# Patient Record
Sex: FEMALE | Race: BLACK OR AFRICAN AMERICAN | NOT HISPANIC OR LATINO
[De-identification: names, ages, dates, MRNs, and addresses within clinical notes are randomized per-mention and may not be internally consistent; named-entity substitution may affect disease eponyms.]

---

## 2018-05-19 ENCOUNTER — HOSPITAL (OUTPATIENT)
Dept: OTHER | Age: 63
End: 2018-05-19
Attending: FAMILY MEDICINE

## 2018-05-21 ENCOUNTER — TELEPHONE (OUTPATIENT)
Dept: INTERNAL MEDICINE CLINIC | Facility: CLINIC | Age: 63
End: 2018-05-21

## 2018-05-22 NOTE — TELEPHONE ENCOUNTER
Dr Eric Slaughter received xray of spine (lumbar, cervical) dated 5/19/18 from [address] for pt to call back. Was she in ER? Will need records. Xray spine report sent to scanning.

## 2018-06-04 ENCOUNTER — TELEPHONE (OUTPATIENT)
Dept: INTERNAL MEDICINE CLINIC | Facility: CLINIC | Age: 63
End: 2018-06-04
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
## 2018-06-06 ENCOUNTER — TELEPHONE (OUTPATIENT)
Dept: INTERNAL MEDICINE CLINIC | Facility: CLINIC | Age: 63
End: 2018-06-06

## 2018-06-06 NOTE — TELEPHONE ENCOUNTER
[place] RX monitoring site. She was treated by Dr Vega Francois. He practices at [facility]. Called and spoke to [place]. She will fax notes and xray report.
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
## 2018-06-07 ENCOUNTER — OFFICE VISIT (OUTPATIENT)
Dept: INTERNAL MEDICINE CLINIC | Facility: CLINIC | Age: 63
End: 2018-06-07

## 2018-06-07 VITALS
WEIGHT: 249.38 LBS | TEMPERATURE: 98 F | DIASTOLIC BLOOD PRESSURE: 76 MMHG | SYSTOLIC BLOOD PRESSURE: 128 MMHG | HEIGHT: 64 IN | BODY MASS INDEX: 42.58 KG/M2 | HEART RATE: 64 BPM | RESPIRATION RATE: 12 BRPM

## 2018-06-07 DIAGNOSIS — M54.2 NECK PAIN: Primary | ICD-10-CM

## 2018-06-07 DIAGNOSIS — M54.50 ACUTE BILATERAL LOW BACK PAIN WITHOUT SCIATICA: ICD-10-CM

## 2018-06-07 PROCEDURE — 99213 OFFICE O/P EST LOW 20 MIN: CPT | Performed by: NURSE PRACTITIONER

## 2018-06-07 RX ORDER — OMEPRAZOLE 20 MG/1
20 CAPSULE, DELAYED RELEASE ORAL
Qty: 30 CAPSULE | Refills: 2 | Status: SHIPPED | OUTPATIENT
Start: 2018-06-07 | End: 2018-08-29

## 2018-06-07 RX ORDER — ACETAMINOPHEN 500 MG
500 TABLET ORAL EVERY 6 HOURS PRN
COMMUNITY

## 2018-06-07 RX ORDER — ACETAMINOPHEN 325 MG/1
325 TABLET ORAL EVERY 6 HOURS PRN
COMMUNITY
End: 2018-06-07 | Stop reason: DRUGHIGH

## 2018-06-07 RX ORDER — OCTISALATE, AVOBENZONE, HOMOSALATE, AND OCTOCRYLENE 29.4; 29.4; 49; 25.48 MG/ML; MG/ML; MG/ML; MG/ML
LOTION TOPICAL AS NEEDED
COMMUNITY

## 2018-06-07 RX ORDER — CYCLOBENZAPRINE HCL 10 MG
10 TABLET ORAL NIGHTLY PRN
Qty: 10 TABLET | Refills: 0 | Status: SHIPPED | OUTPATIENT
Start: 2018-06-07 | End: 2018-06-21

## 2018-06-07 RX ORDER — OMEPRAZOLE 20 MG/1
20 CAPSULE, DELAYED RELEASE ORAL
COMMUNITY
End: 2018-06-07

## 2018-06-07 RX ORDER — NAPROXEN 500 MG/1
500 TABLET ORAL 2 TIMES DAILY WITH MEALS
Qty: 10 TABLET | Refills: 0 | Status: SHIPPED | OUTPATIENT
Start: 2018-06-07 | End: 2018-09-12

## 2018-06-07 NOTE — PROGRESS NOTES
Patient presents with:  Urgent: continues to experinece neck and low back pain. Wants medication for pain relief. Was given Norco and a muscle relaxant by Urgent Care. Last took 2 nights ago.    Medication Request: requesting rx for omeprazole 2 morning before breakfast. Disp: 30 capsule Rfl: 2   Multiple Vitamins-Minerals (MULTIVITAMIN OR) Take by mouth daily. Disp:  Rfl:    Cyanocobalamin (VITAMIN B 12 OR) Take by mouth daily. Disp:  Rfl:    LORATADINE Take by mouth daily as needed.    Disp:  Rfl time. Verbalized understanding of instructions and agreeable to this plan of care. No orders of the defined types were placed in this encounter.       Meds & Refills for this Visit:  Signed Prescriptions Disp Refills    naproxen 500 MG Oral Tab 10 tab

## 2018-06-21 ENCOUNTER — OFFICE VISIT (OUTPATIENT)
Dept: PHYSICAL THERAPY | Age: 63
End: 2018-06-21
Attending: NURSE PRACTITIONER
Payer: COMMERCIAL

## 2018-06-21 DIAGNOSIS — M54.2 NECK PAIN: ICD-10-CM

## 2018-06-21 DIAGNOSIS — M54.50 ACUTE BILATERAL LOW BACK PAIN WITHOUT SCIATICA: ICD-10-CM

## 2018-06-21 PROCEDURE — 97162 PT EVAL MOD COMPLEX 30 MIN: CPT | Performed by: PHYSICAL THERAPIST

## 2018-06-21 PROCEDURE — 97110 THERAPEUTIC EXERCISES: CPT | Performed by: PHYSICAL THERAPIST

## 2018-06-21 NOTE — PROGRESS NOTES
SPINE EVALUATION:   Referring Physician: Dr. Parish Vaughn  Diagnosis: neck pain, acute (B) low back pain     Date of Service: 6/21/2018     PATIENT Yolande Shone is a 65year old y/o female who presents to therapy today with complaints of neck and back cervical and lumbar ROM, soft tissue and joint hypomobility, decreased hip and shoulder strength also limited by back and neck pain and limited function due to pain.   Sadie Tolbert would benefit from skilled Physical Therapy to address the above impairments to [facility] mobility so patient is able to transition sit to stand with decreased pain. (3) Improve sleep posture so patient is able to sleep with decreased report of neck and back pain.   (4) Increase abdominal, hip and UE strength with good spinal stabilization so p

## 2018-06-28 ENCOUNTER — OFFICE VISIT (OUTPATIENT)
Dept: PHYSICAL THERAPY | Age: 63
End: 2018-06-28
Attending: NURSE PRACTITIONER
Payer: COMMERCIAL

## 2018-06-28 PROCEDURE — 97140 MANUAL THERAPY 1/> REGIONS: CPT | Performed by: PHYSICAL THERAPIST

## 2018-06-28 PROCEDURE — 97110 THERAPEUTIC EXERCISES: CPT | Performed by: PHYSICAL THERAPIST

## 2018-06-28 NOTE — PROGRESS NOTES
Dx: neck pain, acute (B) low back pain         Authorized # of Visits:  8         Next MD visit: none scheduled  Fall Risk: standard         Precautions: n/a             Subjective: Feeling better - using the heat on her neck and back which help.   The exer Total Timed Treatment: 45 min  Total Treatment Time: 45 min

## 2018-07-10 ENCOUNTER — OFFICE VISIT (OUTPATIENT)
Dept: PHYSICAL THERAPY | Age: 63
End: 2018-07-10
Attending: NURSE PRACTITIONER
Payer: COMMERCIAL

## 2018-07-10 PROCEDURE — 97110 THERAPEUTIC EXERCISES: CPT | Performed by: PHYSICAL THERAPIST

## 2018-07-10 PROCEDURE — 97140 MANUAL THERAPY 1/> REGIONS: CPT | Performed by: PHYSICAL THERAPIST

## 2018-07-10 NOTE — PROGRESS NOTES
Dx: neck pain, acute (B) low back pain         Authorized # of Visits:  8         Next MD visit: none scheduled  Fall Risk: standard         Precautions: n/a             Subjective: Back and neck are feeling better.   She is more stiff in her back this jaken Supine TA isometrics x 15s Supine TA isometrics x 15          seated UT stretch  (B) x 3 seated UT stretch  (B) x 3         Rhomboids stretch x 3 (B)         TA + SL march x 10        Skilled Services: manual, cuein    Charges: Manual (2), TherEx (1)

## 2018-07-12 ENCOUNTER — OFFICE VISIT (OUTPATIENT)
Dept: PHYSICAL THERAPY | Age: 63
End: 2018-07-12
Attending: NURSE PRACTITIONER
Payer: COMMERCIAL

## 2018-07-12 PROCEDURE — 97110 THERAPEUTIC EXERCISES: CPT | Performed by: PHYSICAL THERAPIST

## 2018-07-12 PROCEDURE — 97140 MANUAL THERAPY 1/> REGIONS: CPT | Performed by: PHYSICAL THERAPIST

## 2018-07-12 NOTE — PROGRESS NOTES
Dx: neck pain, acute (B) low back pain         Authorized # of Visits:  8         Next MD visit: none scheduled  Fall Risk: standard         Precautions: n/a             Subjective: Back and neck are feeling better. Felt great after last PT session. rotation AROM x 10 Cervical rotation AROM x 10 Cervical rotation AROM x 10       Supine TA isometrics x 15s Supine TA isometrics x 15   Supine TA isometrics x 15       seated UT stretch  (B) x 3 seated UT stretch  (B) x 3 seated UT stretch  (B) x 3

## 2018-07-17 ENCOUNTER — OFFICE VISIT (OUTPATIENT)
Dept: PHYSICAL THERAPY | Age: 63
End: 2018-07-17
Attending: NURSE PRACTITIONER
Payer: COMMERCIAL

## 2018-07-17 PROCEDURE — 97110 THERAPEUTIC EXERCISES: CPT | Performed by: PHYSICAL THERAPIST

## 2018-07-17 PROCEDURE — 97140 MANUAL THERAPY 1/> REGIONS: CPT | Performed by: PHYSICAL THERAPIST

## 2018-07-17 NOTE — PROGRESS NOTES
Dx: neck pain, acute (B) low back pain         Authorized # of Visits:  8         Next MD visit: none scheduled  Fall Risk: standard         Precautions: n/a             Subjective: Back and neck are feeling better.   Back is sore sometimes with work and at mobs (L)/(R) grade III/IIII+ x 6 min total sidelying lumbar rotational mobs (L)/(R) grade III/IIII+ x 6 min total    STM  (R) QL x 5 min      LTR x 10 LTR x 10 LTR x 10 LTR x 10      SKTC x 5 (B) SKTC x 5 (B) SKTC x 5 (B) SKTC x 5 (B)      Cervical rotatio

## 2018-07-19 ENCOUNTER — OFFICE VISIT (OUTPATIENT)
Dept: PHYSICAL THERAPY | Age: 63
End: 2018-07-19
Attending: NURSE PRACTITIONER
Payer: COMMERCIAL

## 2018-07-19 PROCEDURE — 97110 THERAPEUTIC EXERCISES: CPT | Performed by: PHYSICAL THERAPIST

## 2018-07-19 PROCEDURE — 97140 MANUAL THERAPY 1/> REGIONS: CPT | Performed by: PHYSICAL THERAPIST

## 2018-07-19 NOTE — PROGRESS NOTES
Authorized # of Visits:  8         Next MD visit: none scheduled  Fall Risk: standard         Precautions: n/a            Progress/Discharge Summary    Dx: neck pain, acute (B) low back pain      Pt has attended  6 visits in Physical Therapy.      Rose this course of care. Thank you for your referral. If you have any questions, please contact me at Dept: [phone].     Sincerely,  Electronically signed by therapist: Rosie ORTEGA      Date: 6/28/2018 TX#: 2/8 Date:  7/10/2018             TX#: 3/8

## 2018-08-29 ENCOUNTER — TELEPHONE (OUTPATIENT)
Dept: INTERNAL MEDICINE CLINIC | Facility: CLINIC | Age: 63
End: 2018-08-29

## 2018-08-30 RX ORDER — NAPROXEN 500 MG/1
500 TABLET ORAL 2 TIMES DAILY WITH MEALS
Qty: 10 TABLET | Refills: 0 | Status: CANCELLED | OUTPATIENT
Start: 2018-08-30

## 2018-08-30 NOTE — TELEPHONE ENCOUNTER
Omeprazole:  Last OV relevant to medication: 06/07/18  Last refill date: 06/07/18  #/refills: #30 / 2 refills  When pt was asked to return for OV: Not indicated.   Upcoming appt/reason: 09/06/18 for PE with Dr. Nyra Leventhal    Naproxen:  Last OV relevant to [facility]

## 2018-08-31 RX ORDER — OMEPRAZOLE 20 MG/1
20 CAPSULE, DELAYED RELEASE ORAL
Qty: 90 CAPSULE | Refills: 0 | Status: SHIPPED | OUTPATIENT
Start: 2018-08-31 | End: 2019-10-04 | Stop reason: ALTCHOICE

## 2018-09-04 NOTE — TELEPHONE ENCOUNTER
[facility] following up regarding refill request for Naproxen stated patient was requesting refill. [facility], [address] [place] [address] [phone], [phone]. Please advise. Thank you!

## 2018-09-12 ENCOUNTER — OFFICE VISIT (OUTPATIENT)
Dept: INTERNAL MEDICINE CLINIC | Facility: CLINIC | Age: 63
End: 2018-09-12
Payer: COMMERCIAL

## 2018-09-12 ENCOUNTER — LAB ENCOUNTER (OUTPATIENT)
Dept: LAB | Age: 63
End: 2018-09-12
Attending: INTERNAL MEDICINE
Payer: COMMERCIAL

## 2018-09-12 VITALS
TEMPERATURE: 99 F | SYSTOLIC BLOOD PRESSURE: 124 MMHG | RESPIRATION RATE: 12 BRPM | WEIGHT: 249.69 LBS | DIASTOLIC BLOOD PRESSURE: 78 MMHG | BODY MASS INDEX: 42.63 KG/M2 | HEART RATE: 60 BPM | HEIGHT: 64 IN

## 2018-09-12 DIAGNOSIS — Z00.00 PHYSICAL EXAM, ANNUAL: ICD-10-CM

## 2018-09-12 DIAGNOSIS — Z78.0 POSTMENOPAUSAL: ICD-10-CM

## 2018-09-12 DIAGNOSIS — Z12.31 SCREENING MAMMOGRAM, ENCOUNTER FOR: ICD-10-CM

## 2018-09-12 DIAGNOSIS — Z23 NEED FOR VACCINATION: ICD-10-CM

## 2018-09-12 LAB
ALBUMIN SERPL-MCNC: 4 G/DL (ref 3.5–4.8)
ALBUMIN/GLOB SERPL: 1.1 {RATIO} (ref 1–2)
ALP LIVER SERPL-CCNC: 85 U/L (ref 50–130)
ALT SERPL-CCNC: 16 U/L (ref 14–54)
ANION GAP SERPL CALC-SCNC: 7 MMOL/L (ref 0–18)
AST SERPL-CCNC: 18 U/L (ref 15–41)
BASOPHILS # BLD AUTO: 0.03 X10(3) UL (ref 0–0.1)
BASOPHILS NFR BLD AUTO: 0.6 %
BILIRUB SERPL-MCNC: 0.4 MG/DL (ref 0.1–2)
BUN BLD-MCNC: 16 MG/DL (ref 8–20)
BUN/CREAT SERPL: 11.2 (ref 10–20)
CALCIUM BLD-MCNC: 9.2 MG/DL (ref 8.3–10.3)
CHLORIDE SERPL-SCNC: 110 MMOL/L (ref 101–111)
CHOLEST SMN-MCNC: 214 MG/DL (ref ?–200)
CO2 SERPL-SCNC: 27 MMOL/L (ref 22–32)
CREAT BLD-MCNC: 1.43 MG/DL (ref 0.55–1.02)
EOSINOPHIL # BLD AUTO: 0.17 X10(3) UL (ref 0–0.3)
EOSINOPHIL NFR BLD AUTO: 3.6 %
ERYTHROCYTE [DISTWIDTH] IN BLOOD BY AUTOMATED COUNT: 14.3 % (ref 11.5–16)
GLOBULIN PLAS-MCNC: 3.7 G/DL (ref 2.5–4)
GLUCOSE BLD-MCNC: 87 MG/DL (ref 70–99)
HCT VFR BLD AUTO: 42.4 % (ref 34–50)
HDLC SERPL-MCNC: 54 MG/DL (ref 40–59)
HGB BLD-MCNC: 13.6 G/DL (ref 12–16)
IMMATURE GRANULOCYTE COUNT: 0.01 X10(3) UL (ref 0–1)
IMMATURE GRANULOCYTE RATIO %: 0.2 %
LDLC SERPL CALC-MCNC: 142 MG/DL (ref ?–100)
LYMPHOCYTES # BLD AUTO: 1.87 X10(3) UL (ref 0.9–4)
LYMPHOCYTES NFR BLD AUTO: 40.1 %
M PROTEIN MFR SERPL ELPH: 7.7 G/DL (ref 6.1–8.3)
MCH RBC QN AUTO: 29.1 PG (ref 27–33.2)
MCHC RBC AUTO-ENTMCNC: 32.1 G/DL (ref 31–37)
MCV RBC AUTO: 90.6 FL (ref 81–100)
MONOCYTES # BLD AUTO: 0.31 X10(3) UL (ref 0.1–1)
MONOCYTES NFR BLD AUTO: 6.7 %
NEUTROPHIL ABS PRELIM: 2.27 X10 (3) UL (ref 1.3–6.7)
NEUTROPHILS # BLD AUTO: 2.27 X10(3) UL (ref 1.3–6.7)
NEUTROPHILS NFR BLD AUTO: 48.8 %
NONHDLC SERPL-MCNC: 160 MG/DL (ref ?–130)
OSMOLALITY SERPL CALC.SUM OF ELEC: 299 MOSM/KG (ref 275–295)
PLATELET # BLD AUTO: 274 10(3)UL (ref 150–450)
POTASSIUM SERPL-SCNC: 4.2 MMOL/L (ref 3.6–5.1)
RBC # BLD AUTO: 4.68 X10(6)UL (ref 3.8–5.1)
RED CELL DISTRIBUTION WIDTH-SD: 47.4 FL (ref 35.1–46.3)
SODIUM SERPL-SCNC: 144 MMOL/L (ref 136–144)
TRIGL SERPL-MCNC: 90 MG/DL (ref 30–149)
TSI SER-ACNC: 2.7 MIU/ML (ref 0.35–5.5)
VLDLC SERPL CALC-MCNC: 18 MG/DL (ref 0–30)
WBC # BLD AUTO: 4.7 X10(3) UL (ref 4–13)

## 2018-09-12 PROCEDURE — 36415 COLL VENOUS BLD VENIPUNCTURE: CPT

## 2018-09-12 PROCEDURE — 99396 PREV VISIT EST AGE 40-64: CPT | Performed by: INTERNAL MEDICINE

## 2018-09-12 PROCEDURE — 85025 COMPLETE CBC W/AUTO DIFF WBC: CPT

## 2018-09-12 PROCEDURE — 84443 ASSAY THYROID STIM HORMONE: CPT

## 2018-09-12 PROCEDURE — 80061 LIPID PANEL: CPT

## 2018-09-12 PROCEDURE — 80053 COMPREHEN METABOLIC PANEL: CPT

## 2018-09-12 NOTE — PROGRESS NOTES
[facility]    CHIEF COMPLAINT: Patient presents with:  Routine Physical: 8/24/16 normal pap, neg HPV  Imm/Inj: declines flu shot. will get at work        HPI:   Kishore Magana is a 59year old female who presents for a complete physical exam. 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    Exercise: walking.   Diet: watches mini absent, Uterus:  Masses absent, Adnexa: Masses absent, Tenderness absent, Anus/Perineum:  Lesions absent and Masses absent  No pap today.    MUSCULOSKELETAL: back is not tender,FROM of the back  EXTREMITIES: no cyanosis, clubbing or edema  NEURO: Oriented (TBT=37848); Future    3. Postmenopausal  - XR DEXA BONE DENSITOMETRY (CPT=77080); Future    4. Need for vaccination  Declines. Will get at work. The patient is asked to return in 1 year for cpx.     Shantell Sauceda MD

## 2018-09-24 ENCOUNTER — TELEPHONE (OUTPATIENT)
Dept: INTERNAL MEDICINE CLINIC | Facility: CLINIC | Age: 63
End: 2018-09-24

## 2018-09-24 NOTE — TELEPHONE ENCOUNTER
Needs evaluation. If pain is severe go to urgent care. If persistent numbness and tingling go to urgent care. Otherwise see me tomorrow for evaluation. Okay to use my acute spot.

## 2018-09-24 NOTE — TELEPHONE ENCOUNTER
Spoke with pt  Advised eval needed. Denies persistent numbness or tingling or pain. Scheduled with Dr Clarence Turcios tomorrow morning.

## 2018-09-24 NOTE — TELEPHONE ENCOUNTER
Seen by Dr. Toyin Keen 9/12/18, patient states she has been taking omeprazole for 3 months and began having muscle cramping in calves that started yesterday morning. Patient came home last night, limping from tightness in calves.   Patient states she took [employer]

## 2018-09-25 ENCOUNTER — OFFICE VISIT (OUTPATIENT)
Dept: INTERNAL MEDICINE CLINIC | Facility: CLINIC | Age: 63
End: 2018-09-25
Payer: COMMERCIAL

## 2018-09-25 VITALS
SYSTOLIC BLOOD PRESSURE: 110 MMHG | TEMPERATURE: 98 F | BODY MASS INDEX: 42.59 KG/M2 | DIASTOLIC BLOOD PRESSURE: 74 MMHG | WEIGHT: 249.5 LBS | RESPIRATION RATE: 12 BRPM | HEIGHT: 64 IN | HEART RATE: 72 BPM

## 2018-09-25 DIAGNOSIS — R25.2 LEG CRAMPS: ICD-10-CM

## 2018-09-25 DIAGNOSIS — K21.9 GASTROESOPHAGEAL REFLUX DISEASE, ESOPHAGITIS PRESENCE NOT SPECIFIED: ICD-10-CM

## 2018-09-25 DIAGNOSIS — L72.9 BENIGN CYST OF SKIN: Primary | ICD-10-CM

## 2018-09-25 PROCEDURE — 99214 OFFICE O/P EST MOD 30 MIN: CPT | Performed by: INTERNAL MEDICINE

## 2018-09-25 RX ORDER — CYCLOBENZAPRINE HCL 10 MG
10 TABLET ORAL NIGHTLY PRN
Qty: 10 TABLET | Refills: 0 | Status: SHIPPED | OUTPATIENT
Start: 2018-09-25 | End: 2021-02-02 | Stop reason: ALTCHOICE

## 2018-09-25 RX ORDER — CYCLOBENZAPRINE HCL 10 MG
10 TABLET ORAL 3 TIMES DAILY PRN
COMMUNITY
End: 2018-09-25

## 2018-09-25 RX ORDER — MULTIVITAMIN WITH IRON
250 TABLET ORAL DAILY
COMMUNITY
End: 2021-02-02 | Stop reason: ALTCHOICE

## 2018-09-25 NOTE — PROGRESS NOTES
[facility]    CHIEF COMPLAINT:  Patient presents with:  Cramps: in calve.  2 days ago experienced cramping in left calf that traveled to the back of the knee. Took Magnesium 250mg. Also took cyclobenzaprine last night.    Imm/Inj: decli Rfl:    omeprazole 20 MG Oral Capsule Delayed Release Take 1 capsule (20 mg total) by mouth every morning before breakfast. Disp: 90 capsule Rfl: 0       PAST MEDICAL, SOCIAL, AND FAMILY HISTORY:  Tobacco:    Smoking status: Never Smoker   Smokeless tobacc x10(3) uL    RBC 4.68 3.80 - 5.10 x10(6)uL    HGB 13.6 12.0 - 16.0 g/dL    HCT 42.4 34.0 - 50.0 %    .0 150.0 - 450.0 10(3)uL    MCV 90.6 81.0 - 100.0 fL    MCH 29.1 27.0 - 33.2 pg    MCHC 32.1 31.0 - 37.0 g/dL    RDW 14.3 11.5 - 16.0 %    RDW-SD 47

## 2018-10-11 ENCOUNTER — TELEPHONE (OUTPATIENT)
Dept: INTERNAL MEDICINE CLINIC | Facility: CLINIC | Age: 63
End: 2018-10-11

## 2018-10-11 NOTE — TELEPHONE ENCOUNTER
Patient calling back stating she has been stretching, elevating her legs when sitting and keeping her feet warm. Patient is open to an OV, scheduled with Dr. Jen Felton 10/22/18.

## 2018-10-11 NOTE — TELEPHONE ENCOUNTER
Noted below, and LM for the patient to call us back. Has she been also doing what Dr. Arianna Rock recommends such as stretching exercises, keeping her feet warm, elevating her feet when sitting (see OV note from 09/25/18)?  Dr. Arianna Rock indicated on her OV note that t

## 2018-10-11 NOTE — TELEPHONE ENCOUNTER
Patient was in to see Dr. Farheen Lazaro on September 25th for muscle cramping. Patient states that the muscle cramping has gone away but is still feeling pain in her knee. Patient states that the pain is not noticeable when she has the ace bandage around it.  Once t

## 2018-10-15 ENCOUNTER — APPOINTMENT (OUTPATIENT)
Dept: GENERAL RADIOLOGY | Age: 63
End: 2018-10-15
Attending: NURSE PRACTITIONER
Payer: COMMERCIAL

## 2018-10-15 ENCOUNTER — HOSPITAL ENCOUNTER (OUTPATIENT)
Age: 63
Discharge: HOME | End: 2018-10-15
Payer: COMMERCIAL

## 2018-10-15 VITALS
SYSTOLIC BLOOD PRESSURE: 122 MMHG | HEART RATE: 62 BPM | OXYGEN SATURATION: 98 % | RESPIRATION RATE: 16 BRPM | TEMPERATURE: 97 F | DIASTOLIC BLOOD PRESSURE: 72 MMHG

## 2018-10-15 DIAGNOSIS — M25.562 ACUTE PAIN OF LEFT KNEE: ICD-10-CM

## 2018-10-15 DIAGNOSIS — M25.462 KNEE EFFUSION, LEFT: Primary | ICD-10-CM

## 2018-10-15 PROCEDURE — 99213 OFFICE O/P EST LOW 20 MIN: CPT

## 2018-10-15 PROCEDURE — 73560 X-RAY EXAM OF KNEE 1 OR 2: CPT | Performed by: NURSE PRACTITIONER

## 2018-10-15 RX ORDER — IBUPROFEN 800 MG/1
800 TABLET ORAL ONCE
Status: COMPLETED | OUTPATIENT
Start: 2018-10-15 | End: 2018-10-15

## 2018-10-15 RX ORDER — IBUPROFEN 800 MG/1
800 TABLET ORAL EVERY 8 HOURS PRN
Qty: 30 TABLET | Refills: 0 | Status: SHIPPED | OUTPATIENT
Start: 2018-10-15 | End: 2018-10-22

## 2018-10-15 NOTE — ED PROVIDER NOTES
Patient Seen in: [facility]    History   Patient presents with:  Knee Pain    Stated Complaint: pulled muscle calf knee pain x few weeks    70-year-old female who presents to the immediate care with complaints of left knee pain. negative. Positive for stated complaint: pulled muscle calf knee pain x few weeks  Other systems are as noted in HPI. Constitutional and vital signs reviewed. All other systems reviewed and negative except as noted above.     Physical Exam     ED with isolated injury documented above.  x-rays negative for acute fracture or dislocation. Patient's pain has improved with medications and  has no signs of neurovascular compromise or compartment syndrome.   I adressed with the patient the possibly of more

## 2018-10-15 NOTE — ED INITIAL ASSESSMENT (HPI)
Pt was moving a cough 2 weeks ago and pulled her calf muscle. The pain then moved to her knee. She saw her MD who gave muscle relaxers, but she stated that pain persists and 800mg of ibuprofen works better.

## 2018-11-05 RX ORDER — OMEPRAZOLE 20 MG/1
CAPSULE, DELAYED RELEASE ORAL
Qty: 90 CAPSULE | Refills: 0 | OUTPATIENT
Start: 2018-11-05

## 2019-01-25 ENCOUNTER — HOSPITAL ENCOUNTER (OUTPATIENT)
Age: 64
Discharge: HOME | End: 2019-01-25
Payer: COMMERCIAL

## 2019-01-25 ENCOUNTER — APPOINTMENT (OUTPATIENT)
Dept: GENERAL RADIOLOGY | Age: 64
End: 2019-01-25
Attending: NURSE PRACTITIONER
Payer: COMMERCIAL

## 2019-01-25 VITALS
DIASTOLIC BLOOD PRESSURE: 79 MMHG | HEART RATE: 67 BPM | OXYGEN SATURATION: 99 % | SYSTOLIC BLOOD PRESSURE: 160 MMHG | RESPIRATION RATE: 17 BRPM | TEMPERATURE: 98 F

## 2019-01-25 DIAGNOSIS — M25.562 ACUTE PAIN OF LEFT KNEE: Primary | ICD-10-CM

## 2019-01-25 PROCEDURE — 73562 X-RAY EXAM OF KNEE 3: CPT | Performed by: NURSE PRACTITIONER

## 2019-01-25 PROCEDURE — 99213 OFFICE O/P EST LOW 20 MIN: CPT

## 2019-01-25 RX ORDER — IBUPROFEN 800 MG/1
800 TABLET ORAL EVERY 8 HOURS PRN
Qty: 30 TABLET | Refills: 0 | Status: SHIPPED | OUTPATIENT
Start: 2019-01-25 | End: 2019-02-01

## 2019-01-25 NOTE — ED PROVIDER NOTES
Patient Seen in: [facility]    History   Patient presents with:  Lower Extremity Injury (musculoskeletal)    Stated Complaint: left knee pain    HPI  60 yo female presents with left medial knee pain after slipping on the ice 2 d round, and reactive to light. Cardiovascular: Normal rate, regular rhythm, normal heart sounds and intact distal pulses. Pulmonary/Chest: Effort normal and breath sounds normal.   Musculoskeletal:        Left knee: She exhibits swelling.  She exhibits n changes. Unable to rule out ligament damage. Pt given ace with f/u with ortho and pain meds. Pt agrees with f/u.           Disposition and Plan     Clinical Impression:  Acute pain of left knee  (primary encounter diagnosis)    Disposition:  Discharge  1

## 2019-01-25 NOTE — ED INITIAL ASSESSMENT (HPI)
Pt slipped on ice 2 days ago and twisted L knee - pt states she did not fall but has pain/swelling to L knee

## 2019-10-04 PROCEDURE — 86803 HEPATITIS C AB TEST: CPT | Performed by: FAMILY MEDICINE

## 2019-10-31 PROCEDURE — 88305 TISSUE EXAM BY PATHOLOGIST: CPT | Performed by: RADIOLOGY

## 2020-02-07 PROBLEM — M71.372 OTHER BURSAL CYST, LEFT ANKLE AND FOOT: Status: ACTIVE | Noted: 2020-02-07

## 2021-01-17 ENCOUNTER — HOSPITAL ENCOUNTER (OUTPATIENT)
Age: 66
Discharge: HOME | End: 2021-01-17

## 2021-01-17 VITALS
HEART RATE: 74 BPM | SYSTOLIC BLOOD PRESSURE: 147 MMHG | RESPIRATION RATE: 18 BRPM | DIASTOLIC BLOOD PRESSURE: 72 MMHG | OXYGEN SATURATION: 98 % | TEMPERATURE: 99 F

## 2021-01-17 DIAGNOSIS — B02.9 HERPES ZOSTER WITHOUT COMPLICATION: Primary | ICD-10-CM

## 2021-01-17 PROCEDURE — 99214 OFFICE O/P EST MOD 30 MIN: CPT

## 2021-01-17 RX ORDER — HYDROCODONE BITARTRATE AND ACETAMINOPHEN 5; 325 MG/1; MG/1
1 TABLET ORAL EVERY 6 HOURS PRN
Qty: 10 TABLET | Refills: 0 | Status: SHIPPED | OUTPATIENT
Start: 2021-01-17 | End: 2021-01-24

## 2021-01-17 RX ORDER — KETOROLAC TROMETHAMINE 5 MG/ML
1 SOLUTION OPHTHALMIC 4 TIMES DAILY
Qty: 5 ML | Refills: 0 | Status: SHIPPED | OUTPATIENT
Start: 2021-01-17 | End: 2021-02-02 | Stop reason: ALTCHOICE

## 2021-01-17 RX ORDER — VALACYCLOVIR HYDROCHLORIDE 1 G/1
1 TABLET, FILM COATED ORAL 3 TIMES DAILY
Qty: 21 TABLET | Refills: 0 | Status: SHIPPED | OUTPATIENT
Start: 2021-01-17 | End: 2021-01-24

## 2021-01-17 NOTE — ED INITIAL ASSESSMENT (HPI)
Pt began 5 days ago with what she thought was a rash to her rt side of face and into her hairline. It has since spread to her eye and has begun to blister. Pt states it is a tingling pain and has used benadryl, advil.
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
## 2021-11-11 PROBLEM — E66.01 MORBID OBESITY WITH BMI OF 40.0-44.9, ADULT (HCC): Status: ACTIVE | Noted: 2021-11-11

## 2021-11-11 PROBLEM — F33.1 MODERATE EPISODE OF RECURRENT MAJOR DEPRESSIVE DISORDER (HCC): Status: ACTIVE | Noted: 2021-11-11

## 2021-11-11 PROBLEM — N18.31 STAGE 3A CHRONIC KIDNEY DISEASE (HCC): Status: ACTIVE | Noted: 2021-11-11

## 2022-01-17 ENCOUNTER — OFFICE VISIT (OUTPATIENT)
Dept: INTERNAL MEDICINE CLINIC | Facility: CLINIC | Age: 67
End: 2022-01-17
Payer: MEDICARE

## 2022-01-17 VITALS
WEIGHT: 253 LBS | BODY MASS INDEX: 44.83 KG/M2 | SYSTOLIC BLOOD PRESSURE: 130 MMHG | TEMPERATURE: 98 F | HEART RATE: 60 BPM | HEIGHT: 63 IN | DIASTOLIC BLOOD PRESSURE: 80 MMHG | RESPIRATION RATE: 16 BRPM | OXYGEN SATURATION: 97 %

## 2022-01-17 DIAGNOSIS — B02.29 POSTHERPETIC NEURALGIA: Primary | ICD-10-CM

## 2022-01-17 DIAGNOSIS — F32.A DEPRESSION, UNSPECIFIED DEPRESSION TYPE: ICD-10-CM

## 2022-01-17 DIAGNOSIS — N18.31 STAGE 3A CHRONIC KIDNEY DISEASE (HCC): ICD-10-CM

## 2022-01-17 DIAGNOSIS — E66.01 CLASS 3 SEVERE OBESITY WITHOUT SERIOUS COMORBIDITY WITH BODY MASS INDEX (BMI) OF 40.0 TO 44.9 IN ADULT, UNSPECIFIED OBESITY TYPE (HCC): ICD-10-CM

## 2022-01-17 DIAGNOSIS — M71.372 OTHER BURSAL CYST, LEFT ANKLE AND FOOT: ICD-10-CM

## 2022-01-17 PROBLEM — E66.813 CLASS 3 SEVERE OBESITY WITHOUT SERIOUS COMORBIDITY WITH BODY MASS INDEX (BMI) OF 40.0 TO 44.9 IN ADULT: Status: ACTIVE | Noted: 2021-11-11

## 2022-01-17 PROBLEM — E66.813 CLASS 3 SEVERE OBESITY WITHOUT SERIOUS COMORBIDITY WITH BODY MASS INDEX (BMI) OF 40.0 TO 44.9 IN ADULT (HCC): Status: ACTIVE | Noted: 2021-11-11

## 2022-01-17 PROCEDURE — 3079F DIAST BP 80-89 MM HG: CPT | Performed by: INTERNAL MEDICINE

## 2022-01-17 PROCEDURE — 3075F SYST BP GE 130 - 139MM HG: CPT | Performed by: INTERNAL MEDICINE

## 2022-01-17 PROCEDURE — 99204 OFFICE O/P NEW MOD 45 MIN: CPT | Performed by: INTERNAL MEDICINE

## 2022-01-17 PROCEDURE — 3008F BODY MASS INDEX DOCD: CPT | Performed by: INTERNAL MEDICINE

## 2022-01-17 RX ORDER — GABAPENTIN 100 MG/1
CAPSULE ORAL
Qty: 360 CAPSULE | Refills: 0 | COMMUNITY
Start: 2022-01-17

## 2022-01-17 NOTE — PATIENT INSTRUCTIONS
Cut down the gabapentin to one tablet at night rather than 2  See podiatry  Exercise and do things you enjoy

## 2022-01-17 NOTE — PROGRESS NOTES
Patient Office Visit    ASSESSMENT AND PLAN:   (B02.29) Postherpetic neuralgia  (primary encounter diagnosis)  Plan:may take time to improve. Will decrease to gabapentin twice a day to see if symptoms of wheezing improve.  Stable currently     (F32.A) Mary Anne shingles on the right side of her face and near the eye. The rash completely resolved, but numbness/tingling continues although really improved. When she uses her CPAP machine that's when she feels it more.  She has also noticed gabapentin helps with the pa (MULTIVITAMIN OR), Take by mouth daily. , Disp: , Rfl:   LORATADINE, Take 10 mg by mouth daily as needed. , Disp: , Rfl:     No current facility-administered medications on file prior to visit.         REVIEW OF SYSTEMS   Constitutional: no fatigue normal e

## 2022-03-16 ENCOUNTER — OFFICE VISIT (OUTPATIENT)
Dept: INTERNAL MEDICINE CLINIC | Facility: CLINIC | Age: 67
End: 2022-03-16
Payer: MEDICARE

## 2022-03-16 VITALS
OXYGEN SATURATION: 96 % | DIASTOLIC BLOOD PRESSURE: 72 MMHG | BODY MASS INDEX: 45 KG/M2 | TEMPERATURE: 98 F | HEART RATE: 75 BPM | WEIGHT: 254 LBS | HEIGHT: 63 IN | SYSTOLIC BLOOD PRESSURE: 122 MMHG | RESPIRATION RATE: 16 BRPM

## 2022-03-16 DIAGNOSIS — Z78.0 POSTMENOPAUSAL: ICD-10-CM

## 2022-03-16 DIAGNOSIS — Z12.31 ENCOUNTER FOR SCREENING MAMMOGRAM FOR MALIGNANT NEOPLASM OF BREAST: ICD-10-CM

## 2022-03-16 DIAGNOSIS — Z00.00 ROUTINE PHYSICAL EXAMINATION: Primary | ICD-10-CM

## 2022-03-16 DIAGNOSIS — F33.1 MODERATE EPISODE OF RECURRENT MAJOR DEPRESSIVE DISORDER (HCC): ICD-10-CM

## 2022-03-16 DIAGNOSIS — N18.31 STAGE 3A CHRONIC KIDNEY DISEASE (HCC): ICD-10-CM

## 2022-03-16 DIAGNOSIS — E66.01 CLASS 3 SEVERE OBESITY WITHOUT SERIOUS COMORBIDITY WITH BODY MASS INDEX (BMI) OF 40.0 TO 44.9 IN ADULT, UNSPECIFIED OBESITY TYPE (HCC): ICD-10-CM

## 2022-03-16 PROBLEM — M71.372 OTHER BURSAL CYST, LEFT ANKLE AND FOOT: Status: RESOLVED | Noted: 2020-02-07 | Resolved: 2022-03-16

## 2022-03-16 PROCEDURE — 3078F DIAST BP <80 MM HG: CPT | Performed by: INTERNAL MEDICINE

## 2022-03-16 PROCEDURE — 3008F BODY MASS INDEX DOCD: CPT | Performed by: INTERNAL MEDICINE

## 2022-03-16 PROCEDURE — 96160 PT-FOCUSED HLTH RISK ASSMT: CPT | Performed by: INTERNAL MEDICINE

## 2022-03-16 PROCEDURE — 3074F SYST BP LT 130 MM HG: CPT | Performed by: INTERNAL MEDICINE

## 2022-03-16 PROCEDURE — 99397 PER PM REEVAL EST PAT 65+ YR: CPT | Performed by: INTERNAL MEDICINE

## 2022-03-16 PROCEDURE — G0402 INITIAL PREVENTIVE EXAM: HCPCS | Performed by: INTERNAL MEDICINE

## 2022-03-30 ENCOUNTER — HOSPITAL ENCOUNTER (OUTPATIENT)
Dept: MAMMOGRAPHY | Age: 67
Discharge: HOME | End: 2022-03-30
Attending: INTERNAL MEDICINE
Payer: MEDICARE

## 2022-03-30 ENCOUNTER — HOSPITAL ENCOUNTER (OUTPATIENT)
Dept: BONE DENSITY | Age: 67
Discharge: HOME | End: 2022-03-30
Attending: INTERNAL MEDICINE
Payer: MEDICARE

## 2022-03-30 ENCOUNTER — TELEPHONE (OUTPATIENT)
Dept: INTERNAL MEDICINE CLINIC | Facility: CLINIC | Age: 67
End: 2022-03-30

## 2022-03-30 DIAGNOSIS — Z78.0 POSTMENOPAUSAL: ICD-10-CM

## 2022-03-30 DIAGNOSIS — Z12.31 ENCOUNTER FOR SCREENING MAMMOGRAM FOR MALIGNANT NEOPLASM OF BREAST: ICD-10-CM

## 2022-03-30 PROCEDURE — 77080 DXA BONE DENSITY AXIAL: CPT | Performed by: INTERNAL MEDICINE

## 2022-03-30 PROCEDURE — 77063 BREAST TOMOSYNTHESIS BI: CPT | Performed by: INTERNAL MEDICINE

## 2022-03-30 PROCEDURE — 77067 SCR MAMMO BI INCL CAD: CPT | Performed by: INTERNAL MEDICINE

## 2022-03-30 NOTE — TELEPHONE ENCOUNTER
Please have patient schedule a video visit with me to discuss her bone density results  Ishan Carvalho DO

## 2022-03-30 NOTE — TELEPHONE ENCOUNTER
Future Appointments   Date Time Provider Elana Barlow   4/4/2022  1:00 PM Tim Dahl,  [facility] [place]

## 2022-04-04 ENCOUNTER — TELEMEDICINE (OUTPATIENT)
Dept: INTERNAL MEDICINE CLINIC | Facility: CLINIC | Age: 67
End: 2022-04-04

## 2022-04-04 DIAGNOSIS — M81.0 AGE-RELATED OSTEOPOROSIS WITHOUT CURRENT PATHOLOGICAL FRACTURE: Primary | ICD-10-CM

## 2022-04-04 PROCEDURE — 99213 OFFICE O/P EST LOW 20 MIN: CPT | Performed by: INTERNAL MEDICINE

## 2022-04-04 RX ORDER — ALENDRONATE SODIUM 70 MG/1
TABLET ORAL
Qty: 12 TABLET | Refills: 0 | Status: SHIPPED | OUTPATIENT
Start: 2022-04-04

## 2022-04-04 NOTE — PROGRESS NOTES
Virtual Telephone Check-In    This visit is conducted using Telemedicine with live, interactive video and audio. Patient resides in [place]   Patient understands and accepts financial responsibility for any deductible, co-insurance and/or co-pays associated with this service. Telehealth outside of [address] Verbal Consent   I conducted a telehealth visit with Merry Mclaughlin on 4/4/2022   which was completed using two-way, real-time interactive audio and video communication. This has been done in good luis to provide continuity of care in the best interest of the provider-patient relationship, due to the COVID -19 public health crisis/national emergency where restrictions of face-to-face office visits are ongoing. Every conscious effort was taken to allow for sufficient and adequate time to complete the visit. The patient was made aware of the limitations of the telehealth visit, including treatment limitations as no physical exam could be performed. The patient was advised to call 911 or to go to the ER in case there was an emergency. The patient was also advised of the potential privacy & security concerns related to the telehealth platform. The patient was made aware of where to find [facility] notice of privacy practices, telehealth consent form and other related consent forms and documents. which are located on the [facility] website. The patient verbally agreed to telehealth consent form, related consents and the risks discussed. Lastly, the patient confirmed that they were in [place]. Included in this visit, time may have been spent reviewing labs, medications, radiology tests and decision making. Appropriate medical decision-making and tests are ordered as detailed in the plan of care above. Coding/billing information is submitted for this visit based on complexity of care and/or time spent for the visit.   Time spent: 8 minutes   HPI: Tex Silveira is a 73year old female who is calling to discuss her test results. ROS:  General: Feels well overall  Skin: Denies any unusual skin lesions  Eyes: Denies blurred vision or double vision  All systems negative, except for above  Physical:  GENERAL: Alert and oriented, well developed, well nourished,in no apparent distress  SKIN: no rashes,no suspicious lesions on face  LUNGS: no audible wheezing  PSYCH: pleasant, appropriate mood and affect    Assessment and Plan  (M81.0) Age-related osteoporosis without current pathological fracture  (primary encounter diagnosis)  Plan: alendronate 70 MG Oral Tab  Note: will start medication above. Continue weight bearing exercises. Please take 2000 IU of vitamin D daily and calcium 600 mg through supplement and 600 mg through diet.  Will repeat test in 2 years  [address] [place] [address]

## 2022-06-21 ENCOUNTER — TELEPHONE (OUTPATIENT)
Dept: INTERNAL MEDICINE CLINIC | Facility: CLINIC | Age: 67
End: 2022-06-21

## 2022-06-21 DIAGNOSIS — M81.0 AGE-RELATED OSTEOPOROSIS WITHOUT CURRENT PATHOLOGICAL FRACTURE: ICD-10-CM

## 2022-06-21 NOTE — TELEPHONE ENCOUNTER
Pt called stating she has been taking alendronate 70 MG for awhile now and she would like to know if she is still supposed to take it. Pt has one pill left and unsure if she will need a refill or to discontinue taking it.

## 2022-06-22 RX ORDER — ALENDRONATE SODIUM 70 MG/1
TABLET ORAL
Qty: 12 TABLET | Refills: 3 | Status: SHIPPED | OUTPATIENT
Start: 2022-06-22

## 2022-06-22 NOTE — TELEPHONE ENCOUNTER
SV- refill for alendronate or discontinue? Spoke with patient via phone. Patient confirmed she took her last alendronate this week (12-week course complete). Will need refill if she is to continue.

## 2022-07-02 LAB
ABSOLUTE BASOPHILS: 42 CELLS/UL (ref 0–200)
ABSOLUTE EOSINOPHILS: 143 CELLS/UL (ref 15–500)
ABSOLUTE LYMPHOCYTES: 1777 CELLS/UL (ref 850–3900)
ABSOLUTE MONOCYTES: 252 CELLS/UL (ref 200–950)
ABSOLUTE NEUTROPHILS: 1987 CELLS/UL (ref 1500–7800)
ALT: 8 U/L (ref 6–29)
AST: 16 U/L (ref 10–35)
BASOPHILS: 1 %
BUN/CREATININE RATIO: 11 (CALC) (ref 6–22)
BUN: 18 MG/DL (ref 7–25)
CALCIUM: 10 MG/DL (ref 8.6–10.4)
CARBON DIOXIDE: 26 MMOL/L (ref 20–32)
CHLORIDE: 107 MMOL/L (ref 98–110)
CHOL/HDLC RATIO: 4.1 (CALC)
CHOLESTEROL, TOTAL: 217 MG/DL
CREATININE: 1.61 MG/DL (ref 0.5–0.99)
EGFR IF AFRICN AM: 38 ML/MIN/1.73M2
EGFR IF NONAFRICN AM: 33 ML/MIN/1.73M2
EOSINOPHILS: 3.4 %
GLUCOSE: 87 MG/DL (ref 65–99)
HDL CHOLESTEROL: 53 MG/DL
HEMATOCRIT: 42.3 % (ref 35–45)
HEMOGLOBIN A1C: 5.2 % OF TOTAL HGB
HEMOGLOBIN: 13.9 G/DL (ref 11.7–15.5)
LDL-CHOLESTEROL: 132 MG/DL (CALC)
LYMPHOCYTES: 42.3 %
MCH: 29.2 PG (ref 27–33)
MCHC: 32.9 G/DL (ref 32–36)
MCV: 88.9 FL (ref 80–100)
MONOCYTES: 6 %
MPV: 9.7 FL (ref 7.5–12.5)
NEUTROPHILS: 47.3 %
NON-HDL CHOLESTEROL: 164 MG/DL (CALC)
PLATELET COUNT: 256 THOUSAND/UL (ref 140–400)
POTASSIUM: 4.5 MMOL/L (ref 3.5–5.3)
RDW: 13.2 % (ref 11–15)
RED BLOOD CELL COUNT: 4.76 MILLION/UL (ref 3.8–5.1)
SODIUM: 142 MMOL/L (ref 135–146)
TRIGLYCERIDES: 188 MG/DL
TSH W/REFLEX TO FT4: 2.83 MIU/L (ref 0.4–4.5)
WHITE BLOOD CELL COUNT: 4.2 THOUSAND/UL (ref 3.8–10.8)

## 2022-07-06 DIAGNOSIS — M81.0 AGE-RELATED OSTEOPOROSIS WITHOUT CURRENT PATHOLOGICAL FRACTURE: ICD-10-CM

## 2022-07-06 DIAGNOSIS — N18.31 STAGE 3A CHRONIC KIDNEY DISEASE (HCC): Primary | ICD-10-CM

## 2022-07-12 ENCOUNTER — TELEMEDICINE (OUTPATIENT)
Dept: INTERNAL MEDICINE CLINIC | Facility: CLINIC | Age: 67
End: 2022-07-12

## 2022-07-12 DIAGNOSIS — E78.2 MIXED HYPERLIPIDEMIA: ICD-10-CM

## 2022-07-12 DIAGNOSIS — N18.31 STAGE 3A CHRONIC KIDNEY DISEASE (HCC): Primary | ICD-10-CM

## 2022-07-12 PROBLEM — E78.5 HYPERLIPIDEMIA: Status: ACTIVE | Noted: 2022-07-12

## 2022-07-12 PROCEDURE — 99213 OFFICE O/P EST LOW 20 MIN: CPT | Performed by: INTERNAL MEDICINE

## 2022-07-12 NOTE — PROGRESS NOTES
Virtual Telephone Check-In    This visit is conducted using Telemedicine with live, interactive video and audio. Patient resides in [place]   Patient understands and accepts financial responsibility for any deductible, co-insurance and/or co-pays associated with this service. Telehealth outside of [employer] Verbal Consent   I conducted a telehealth visit with Mike Levine on 7/12/2022   which was completed using two-way, real-time interactive audio and video  communication. This has been done in good luis to provide continuity of care in the best interest of the provider-patient relationship, due to the COVID -19 public health crisis/national emergency where restrictions of face-to-face office visits are ongoing. Every conscious effort was taken to allow for sufficient and adequate time to complete the visit. The patient was made aware of the limitations of the telehealth visit, including treatment limitations as no physical exam could be performed. The patient was advised to call 911 or to go to the ER in case there was an emergency. The patient was also advised of the potential privacy & security concerns related to the telehealth platform. The patient was made aware of where to find [facility] notice of privacy practices, telehealth consent form and other related consent forms and documents. which are located on the [facility] website. The patient verbally agreed to telehealth consent form, related consents and the risks discussed. Lastly, the patient confirmed that they were in [place]. Included in this visit, time may have been spent reviewing labs, medications, radiology tests and decision making. Appropriate medical decision-making and tests are ordered as detailed in the plan of care above. Coding/billing information is submitted for this visit based on complexity of care and/or time spent for the visit.   Time spent: 10 minutes   HPI: Gagandeep Novak is a 73year old female who is calling to discuss test results. Tolerating alendronate well, but does get constipated   ROS:  General: Feels well overall  Skin: Denies any unusual skin lesions  Eyes: Denies blurred vision or double vision  All systems negative, except for above  Physical:  GENERAL: Alert and oriented, well developed, well nourished,in no apparent distress  SKIN: no rashes,no suspicious lesions on face  LUNGS: no audible wheezing  PSYCH: pleasant, appropriate mood and affect    Assessment and Plan  (N18.31) Stage 3a chronic kidney disease ([facility] [address]  (primary encounter diagnosis)  Plan: recommended to increase fluid intake and will recheck labs in 2 weeks     (E78.2) Mixed hyperlipidemia  Plan: declines statin.  Will focus on diet and lifestyle changes and will repeat tests in 3 months    [facility]

## 2022-07-20 LAB
BUN/CREATININE RATIO: 12 (CALC) (ref 6–22)
BUN: 15 MG/DL (ref 7–25)
CALCIUM: 9.6 MG/DL (ref 8.6–10.4)
CARBON DIOXIDE: 28 MMOL/L (ref 20–32)
CHLORIDE: 107 MMOL/L (ref 98–110)
CREATININE: 1.21 MG/DL (ref 0.5–1.05)
EGFR: 50 ML/MIN/1.73M2
GLUCOSE: 88 MG/DL (ref 65–99)
POTASSIUM: 4.7 MMOL/L (ref 3.5–5.3)
SODIUM: 142 MMOL/L (ref 135–146)
VITAMIN D, 25-OH, TOTAL: 42 NG/ML (ref 30–100)

## 2022-08-22 DIAGNOSIS — B02.29 POSTHERPETIC NEURALGIA: ICD-10-CM

## 2022-08-22 RX ORDER — GABAPENTIN 100 MG/1
CAPSULE ORAL
Qty: 360 CAPSULE | Refills: 0 | Status: SHIPPED | OUTPATIENT
Start: 2022-08-22

## 2022-08-22 NOTE — TELEPHONE ENCOUNTER
LOV: 7/12/2022 with Dr. David De León  RTC: no follow-up on file  Last Relevant Labs: 7/19/2022  Filled: 1/17/2022    #360 with 0 refills    No future appointments.

## 2022-09-22 ENCOUNTER — IMMUNIZATION (OUTPATIENT)
Dept: LAB | Age: 67
End: 2022-09-22
Attending: EMERGENCY MEDICINE

## 2022-09-22 DIAGNOSIS — Z23 NEED FOR VACCINATION: Primary | ICD-10-CM

## 2022-09-22 PROCEDURE — 0124A SARSCOV2 VAC BVL 30MCG/0.3ML: CPT

## 2022-10-14 DIAGNOSIS — B02.29 POSTHERPETIC NEURALGIA: ICD-10-CM

## 2022-10-15 RX ORDER — GABAPENTIN 100 MG/1
CAPSULE ORAL
Qty: 360 CAPSULE | Refills: 0 | Status: SHIPPED | OUTPATIENT
Start: 2022-10-15

## 2022-11-14 ENCOUNTER — LAB ENCOUNTER (OUTPATIENT)
Dept: LAB | Age: 67
End: 2022-11-14
Attending: INTERNAL MEDICINE
Payer: MEDICARE

## 2022-11-14 LAB
CHOLEST SERPL-MCNC: 241 MG/DL (ref ?–200)
FASTING PATIENT LIPID ANSWER: YES
HDLC SERPL-MCNC: 66 MG/DL (ref 40–59)
LDLC SERPL CALC-MCNC: 154 MG/DL (ref ?–100)
NONHDLC SERPL-MCNC: 175 MG/DL (ref ?–130)
TRIGL SERPL-MCNC: 117 MG/DL (ref 30–149)
VLDLC SERPL CALC-MCNC: 22 MG/DL (ref 0–30)

## 2022-11-14 PROCEDURE — 80061 LIPID PANEL: CPT | Performed by: INTERNAL MEDICINE

## 2022-11-16 ENCOUNTER — PATIENT MESSAGE (OUTPATIENT)
Dept: INTERNAL MEDICINE CLINIC | Facility: CLINIC | Age: 67
End: 2022-11-16

## 2022-11-16 DIAGNOSIS — E78.2 MIXED HYPERLIPIDEMIA: Primary | ICD-10-CM

## 2022-11-16 RX ORDER — ATORVASTATIN CALCIUM 10 MG/1
10 TABLET, FILM COATED ORAL DAILY
Qty: 90 TABLET | Refills: 0 | Status: SHIPPED | OUTPATIENT
Start: 2022-11-16 | End: 2023-11-11

## 2022-11-16 NOTE — TELEPHONE ENCOUNTER
From: Sandip Service  To: [address] [place] [address]  Sent: 11/16/2022 12:59 PM CST  Subject: Cholesterol     Medication request for cholesterol and amount of mg.

## 2022-11-17 ENCOUNTER — PATIENT MESSAGE (OUTPATIENT)
Dept: INTERNAL MEDICINE CLINIC | Facility: CLINIC | Age: 67
End: 2022-11-17

## 2022-12-20 ENCOUNTER — OFFICE VISIT (OUTPATIENT)
Dept: INTERNAL MEDICINE CLINIC | Facility: CLINIC | Age: 67
End: 2022-12-20
Payer: MEDICARE

## 2022-12-20 VITALS
TEMPERATURE: 98 F | DIASTOLIC BLOOD PRESSURE: 72 MMHG | SYSTOLIC BLOOD PRESSURE: 118 MMHG | BODY MASS INDEX: 44.12 KG/M2 | HEIGHT: 63 IN | WEIGHT: 249 LBS | HEART RATE: 52 BPM | RESPIRATION RATE: 16 BRPM | OXYGEN SATURATION: 99 %

## 2022-12-20 DIAGNOSIS — E66.01 CLASS 3 SEVERE OBESITY WITHOUT SERIOUS COMORBIDITY WITH BODY MASS INDEX (BMI) OF 40.0 TO 44.9 IN ADULT, UNSPECIFIED OBESITY TYPE (HCC): ICD-10-CM

## 2022-12-20 DIAGNOSIS — E55.9 VITAMIN D DEFICIENCY: ICD-10-CM

## 2022-12-20 DIAGNOSIS — E78.2 MIXED HYPERLIPIDEMIA: Primary | ICD-10-CM

## 2022-12-20 DIAGNOSIS — N18.31 STAGE 3A CHRONIC KIDNEY DISEASE (HCC): ICD-10-CM

## 2022-12-20 DIAGNOSIS — M81.0 AGE-RELATED OSTEOPOROSIS WITHOUT CURRENT PATHOLOGICAL FRACTURE: ICD-10-CM

## 2022-12-20 DIAGNOSIS — B02.29 POST HERPETIC NEURALGIA: ICD-10-CM

## 2022-12-20 PROBLEM — F33.1 MODERATE EPISODE OF RECURRENT MAJOR DEPRESSIVE DISORDER (HCC): Status: RESOLVED | Noted: 2021-11-11 | Resolved: 2022-12-20

## 2022-12-20 PROCEDURE — 3074F SYST BP LT 130 MM HG: CPT | Performed by: INTERNAL MEDICINE

## 2022-12-20 PROCEDURE — 3008F BODY MASS INDEX DOCD: CPT | Performed by: INTERNAL MEDICINE

## 2022-12-20 PROCEDURE — G0009 ADMIN PNEUMOCOCCAL VACCINE: HCPCS | Performed by: INTERNAL MEDICINE

## 2022-12-20 PROCEDURE — 90677 PCV20 VACCINE IM: CPT | Performed by: INTERNAL MEDICINE

## 2022-12-20 PROCEDURE — 3078F DIAST BP <80 MM HG: CPT | Performed by: INTERNAL MEDICINE

## 2022-12-20 PROCEDURE — 99214 OFFICE O/P EST MOD 30 MIN: CPT | Performed by: INTERNAL MEDICINE

## 2022-12-20 RX ORDER — HYDROQUINONE
POWDER (GRAM) MISCELLANEOUS
COMMUNITY
Start: 2022-11-01

## 2022-12-20 NOTE — PATIENT INSTRUCTIONS
Please have blood tests done in 3 months    See me back in 6 months    You received the pneumonia vaccine today    Continue your medications

## 2023-03-02 DIAGNOSIS — E78.2 MIXED HYPERLIPIDEMIA: ICD-10-CM

## 2023-03-02 DIAGNOSIS — B02.29 POSTHERPETIC NEURALGIA: ICD-10-CM

## 2023-03-02 RX ORDER — GABAPENTIN 100 MG/1
CAPSULE ORAL
Qty: 360 CAPSULE | Refills: 0 | Status: SHIPPED | OUTPATIENT
Start: 2023-03-02

## 2023-03-02 RX ORDER — ATORVASTATIN CALCIUM 10 MG/1
10 TABLET, FILM COATED ORAL DAILY
Qty: 90 TABLET | Refills: 0 | Status: SHIPPED | OUTPATIENT
Start: 2023-03-02 | End: 2024-02-25

## 2023-03-21 ENCOUNTER — LAB ENCOUNTER (OUTPATIENT)
Dept: LAB | Age: 68
End: 2023-03-21
Attending: INTERNAL MEDICINE
Payer: MEDICARE

## 2023-03-21 DIAGNOSIS — M81.0 AGE-RELATED OSTEOPOROSIS WITHOUT CURRENT PATHOLOGICAL FRACTURE: ICD-10-CM

## 2023-03-21 DIAGNOSIS — E55.9 VITAMIN D DEFICIENCY: ICD-10-CM

## 2023-03-21 LAB
ALT SERPL-CCNC: 16 U/L
ANION GAP SERPL CALC-SCNC: 2 MMOL/L (ref 0–18)
AST SERPL-CCNC: 19 U/L (ref 15–37)
BUN BLD-MCNC: 12 MG/DL (ref 7–18)
CALCIUM BLD-MCNC: 9.4 MG/DL (ref 8.5–10.1)
CHLORIDE SERPL-SCNC: 110 MMOL/L (ref 98–112)
CHOLEST SERPL-MCNC: 146 MG/DL (ref ?–200)
CO2 SERPL-SCNC: 28 MMOL/L (ref 21–32)
CREAT BLD-MCNC: 1.25 MG/DL
FASTING PATIENT LIPID ANSWER: YES
FASTING STATUS PATIENT QL REPORTED: YES
GFR SERPLBLD BASED ON 1.73 SQ M-ARVRAT: 48 ML/MIN/1.73M2 (ref 60–?)
GLUCOSE BLD-MCNC: 89 MG/DL (ref 70–99)
HDLC SERPL-MCNC: 63 MG/DL (ref 40–59)
LDLC SERPL CALC-MCNC: 67 MG/DL (ref ?–100)
NONHDLC SERPL-MCNC: 83 MG/DL (ref ?–130)
OSMOLALITY SERPL CALC.SUM OF ELEC: 289 MOSM/KG (ref 275–295)
POTASSIUM SERPL-SCNC: 3.9 MMOL/L (ref 3.5–5.1)
SODIUM SERPL-SCNC: 140 MMOL/L (ref 136–145)
TRIGL SERPL-MCNC: 82 MG/DL (ref 30–149)
VIT D+METAB SERPL-MCNC: 37.5 NG/ML (ref 30–100)
VLDLC SERPL CALC-MCNC: 12 MG/DL (ref 0–30)

## 2023-03-21 PROCEDURE — 80048 BASIC METABOLIC PNL TOTAL CA: CPT | Performed by: INTERNAL MEDICINE

## 2023-03-21 PROCEDURE — 80061 LIPID PANEL: CPT | Performed by: INTERNAL MEDICINE

## 2023-03-21 PROCEDURE — 82306 VITAMIN D 25 HYDROXY: CPT

## 2023-03-21 PROCEDURE — 84450 TRANSFERASE (AST) (SGOT): CPT | Performed by: INTERNAL MEDICINE

## 2023-03-21 PROCEDURE — 84460 ALANINE AMINO (ALT) (SGPT): CPT | Performed by: INTERNAL MEDICINE

## 2023-04-14 ENCOUNTER — PATIENT MESSAGE (OUTPATIENT)
Dept: INTERNAL MEDICINE CLINIC | Facility: CLINIC | Age: 68
End: 2023-04-14

## 2023-04-17 NOTE — TELEPHONE ENCOUNTER
From: Susan Hawk  To:  [facility]  Sent: 4/14/2023 11:31 AM CDT  Subject: OVA    Overactive Bladder medication please lowest mg available Thanks

## 2023-04-22 ENCOUNTER — TELEMEDICINE (OUTPATIENT)
Dept: INTERNAL MEDICINE CLINIC | Facility: CLINIC | Age: 68
End: 2023-04-22
Payer: MEDICARE

## 2023-04-22 DIAGNOSIS — N39.46 MIXED STRESS AND URGE URINARY INCONTINENCE: Primary | ICD-10-CM

## 2023-04-22 DIAGNOSIS — B02.29 POSTHERPETIC NEURALGIA: ICD-10-CM

## 2023-04-22 DIAGNOSIS — R41.3 MEMORY CHANGE: ICD-10-CM

## 2023-04-22 PROCEDURE — 99213 OFFICE O/P EST LOW 20 MIN: CPT | Performed by: INTERNAL MEDICINE

## 2023-04-22 RX ORDER — OXYBUTYNIN CHLORIDE 5 MG/1
5 TABLET, EXTENDED RELEASE ORAL DAILY
Qty: 90 TABLET | Refills: 0 | Status: SHIPPED | OUTPATIENT
Start: 2023-04-22

## 2023-04-22 NOTE — PROGRESS NOTES
Virtual Telephone Check-In    This visit is conducted using Telemedicine with live, interactive video and audio. Patient resides in [place]   Patient understands and accepts financial responsibility for any deductible, co-insurance and/or co-pays associated with this service. Telehealth outside of [employer] Verbal Consent   I conducted a telehealth visit with Fernie Felipe on 4/22/2023   which was completed using two-way, real-time interactive audio and video  communication. This has been done in good luis to provide continuity of care in the best interest of the provider-patient relationship, due to the COVID -19 public health crisis/national emergency where restrictions of face-to-face office visits are ongoing. Every conscious effort was taken to allow for sufficient and adequate time to complete the visit. The patient was made aware of the limitations of the telehealth visit, including treatment limitations as no physical exam could be performed. The patient was advised to call 911 or to go to the ER in case there was an emergency. The patient was also advised of the potential privacy & security concerns related to the telehealth platform. The patient was made aware of where to find [facility] notice of privacy practices, telehealth consent form and other related consent forms and documents. which are located on the [facility] website. The patient verbally agreed to telehealth consent form, related consents and the risks discussed. Lastly, the patient confirmed that they were in [place]. Included in this visit, time may have been spent reviewing labs, medications, radiology tests and decision making. Appropriate medical decision-making and tests are ordered as detailed in the plan of care above. Coding/billing information is submitted for this visit based on complexity of care and/or time spent for the visit.   Time spent: 10 minutes  HPI: Ashtyn Ramírez is a 78year old female who is calling about urinary incontinence. She is going to the bathroom often. Has leakage too. Is able to get to the bathroom, but is irritating her and would like to try a medication  Her daughter has noticed that she has been having memory changes since starting gabapentin and wondering if there is anything else we can try to help with postherpetic neuralgia. Memory was fine before gabapentin   ROS:  General: Feels well overall  Skin: Denies any unusual skin lesions  Eyes: Denies blurred vision or double vision  All systems negative, except for above  Physical:  GENERAL: Alert and oriented, well developed, well nourished,in no apparent distress  SKIN: no rashes,no suspicious lesions on face  LUNGS: no audible wheezing  PSYCH: pleasant, appropriate mood and affect    Assessment and Plan  (N39.46) Mixed stress and urge urinary incontinence  (primary encounter diagnosis)  Plan: oxybutynin ER 5 MG Oral Tablet 24 Hr  Note: we can try low dose of the medication above. Discussed the side effects    (R41.3) Memory change  Plan: will stop gabapentin and see if any improvement.  If not will do further work up    (B02.29) Postherpetic neuralgia  Plan: will stop gabapentin and monitor  RTC in 1 month  Diego Mayer DO

## 2023-05-18 DIAGNOSIS — M81.0 AGE-RELATED OSTEOPOROSIS WITHOUT CURRENT PATHOLOGICAL FRACTURE: ICD-10-CM

## 2023-05-18 RX ORDER — ALENDRONATE SODIUM 70 MG/1
TABLET ORAL
Qty: 12 TABLET | Refills: 3 | Status: SHIPPED | OUTPATIENT
Start: 2023-05-18

## 2023-05-18 NOTE — TELEPHONE ENCOUNTER
LOV: 4/22/2023 with Dr. Arlette Raza  RTC: 1 month  Last Relevant Labs: 3/21/2023  Last bone dexa scan 3/30/2022  Filled: 6/22/2022     #12 with 3 refills    No future appointments.

## 2023-06-08 DIAGNOSIS — E78.2 MIXED HYPERLIPIDEMIA: ICD-10-CM

## 2023-06-08 NOTE — TELEPHONE ENCOUNTER
LOV: 4/22/2023 with Dr. Juan Buerger  RTC: 1 month  Last Relevant Labs: 3/21/2023   Filled: 3/2/2023    #90 with 0 refills    No future appointments.

## 2023-06-09 RX ORDER — ATORVASTATIN CALCIUM 10 MG/1
TABLET, FILM COATED ORAL
Qty: 90 TABLET | Refills: 1 | Status: SHIPPED | OUTPATIENT
Start: 2023-06-09

## 2023-07-12 ENCOUNTER — TELEPHONE (OUTPATIENT)
Dept: INTERNAL MEDICINE CLINIC | Facility: CLINIC | Age: 68
End: 2023-07-12

## 2023-07-12 DIAGNOSIS — B02.29 POSTHERPETIC NEURALGIA: ICD-10-CM

## 2023-07-12 RX ORDER — GABAPENTIN 100 MG/1
100 CAPSULE ORAL 2 TIMES DAILY PRN
Qty: 180 CAPSULE | Refills: 0 | Status: SHIPPED | OUTPATIENT
Start: 2023-07-12

## 2023-07-12 RX ORDER — GABAPENTIN 100 MG/1
CAPSULE ORAL
Qty: 360 CAPSULE | Refills: 0 | OUTPATIENT
Start: 2023-07-12

## 2023-07-12 RX ORDER — GABAPENTIN 100 MG/1
CAPSULE ORAL
Qty: 120 CAPSULE | Refills: 0 | Status: SHIPPED | OUTPATIENT
Start: 2023-07-12 | End: 2023-07-12

## 2023-07-12 NOTE — TELEPHONE ENCOUNTER
Protocol NONE    Requesting:  gabapentin 100 MG Oral Cap     LOV: 12/20/22 office visit  4/22/23 telemedicine visit (R41.3) Memory change  Plan: will stop gabapentin and see if any improvement. If not will do further work up       RTC: 1 month   Filled: 3/2/23 360 capsules 0 refills  Recent Labs: 3/21/23    Upcoming OV   No future appointments.

## 2023-07-17 NOTE — TELEPHONE ENCOUNTER
Patient called in stating that pharmacy will not release medication since instructions are wrong. Pharmacy stated to pt that it must say \"one to three tablets a day\". Can a MA call pharmacy to verify.        gabapentin 100 MG Oral Cap    [id number] - [place] [facility] - [address] [place] [address] [phone], [address] [place] [address]   Phone: [phone] Fax: [phone]

## 2023-09-11 DIAGNOSIS — E78.2 MIXED HYPERLIPIDEMIA: ICD-10-CM

## 2023-09-11 RX ORDER — ATORVASTATIN CALCIUM 10 MG/1
10 TABLET, FILM COATED ORAL DAILY
Qty: 100 TABLET | Refills: 1 | Status: SHIPPED | OUTPATIENT
Start: 2023-09-11

## 2023-09-13 ENCOUNTER — TELEPHONE (OUTPATIENT)
Dept: INTERNAL MEDICINE CLINIC | Facility: CLINIC | Age: 68
End: 2023-09-13

## 2023-10-26 DIAGNOSIS — B02.29 POSTHERPETIC NEURALGIA: ICD-10-CM

## 2023-10-26 RX ORDER — GABAPENTIN 100 MG/1
100 CAPSULE ORAL 3 TIMES DAILY PRN
Qty: 270 CAPSULE | Refills: 0 | Status: SHIPPED | OUTPATIENT
Start: 2023-10-26

## 2023-10-26 NOTE — TELEPHONE ENCOUNTER
Requested Prescriptions     Pending Prescriptions Disp Refills    GABAPENTIN 100 MG Oral Cap [Pharmacy Med Name: Gabapentin 100 Mg Cap Nort] 270 capsule 0     Sig: Take 1 capsule by mouth 3 times daily as needed. No protocol     LOV 12/20/22  VV 4/22/23  Filled 7/24/23 270 caps 0 refills   No future appointments.

## 2024-01-08 DIAGNOSIS — B02.29 POSTHERPETIC NEURALGIA: ICD-10-CM

## 2024-01-09 RX ORDER — GABAPENTIN 100 MG/1
100 CAPSULE ORAL 3 TIMES DAILY PRN
Qty: 270 CAPSULE | Refills: 0 | Status: SHIPPED | OUTPATIENT
Start: 2024-01-09

## 2024-01-09 NOTE — TELEPHONE ENCOUNTER
Protocol NONE    Requesting: gabapentin 100 MG Oral Cap     LOV: 12/20/22  RTC: 6 months  Filled: 10/26/23 270 capsule 0 refill  Recent Labs: 3/21/23    Upcoming OV   No future appointments.

## 2024-01-31 ENCOUNTER — APPOINTMENT (OUTPATIENT)
Dept: GENERAL RADIOLOGY | Age: 69
End: 2024-01-31
Attending: PHYSICIAN ASSISTANT
Payer: MEDICARE

## 2024-01-31 ENCOUNTER — HOSPITAL ENCOUNTER (OUTPATIENT)
Age: 69
Discharge: HOME | End: 2024-01-31
Payer: MEDICARE

## 2024-01-31 VITALS
WEIGHT: 220 LBS | TEMPERATURE: 98 F | DIASTOLIC BLOOD PRESSURE: 73 MMHG | RESPIRATION RATE: 18 BRPM | BODY MASS INDEX: 36.65 KG/M2 | HEART RATE: 55 BPM | SYSTOLIC BLOOD PRESSURE: 146 MMHG | HEIGHT: 65 IN | OXYGEN SATURATION: 97 %

## 2024-01-31 DIAGNOSIS — M79.631 RIGHT FOREARM PAIN: ICD-10-CM

## 2024-01-31 DIAGNOSIS — S62.316A CLOSED DISPLACED FRACTURE OF BASE OF FIFTH METACARPAL BONE OF RIGHT HAND, INITIAL ENCOUNTER: Primary | ICD-10-CM

## 2024-01-31 PROCEDURE — 29125 APPL SHORT ARM SPLINT STATIC: CPT

## 2024-01-31 PROCEDURE — 90471 IMMUNIZATION ADMIN: CPT

## 2024-01-31 PROCEDURE — 99204 OFFICE O/P NEW MOD 45 MIN: CPT

## 2024-01-31 PROCEDURE — 73130 X-RAY EXAM OF HAND: CPT | Performed by: PHYSICIAN ASSISTANT

## 2024-01-31 PROCEDURE — 73090 X-RAY EXAM OF FOREARM: CPT | Performed by: PHYSICIAN ASSISTANT

## 2024-01-31 PROCEDURE — 99214 OFFICE O/P EST MOD 30 MIN: CPT

## 2024-01-31 RX ORDER — IBUPROFEN 600 MG/1
600 TABLET ORAL EVERY 8 HOURS PRN
Qty: 15 TABLET | Refills: 0 | Status: SHIPPED | OUTPATIENT
Start: 2024-01-31

## 2024-01-31 NOTE — ED PROVIDER NOTES
Patient Seen in: [facility]      History   No chief complaint on file.    Stated Complaint: right hand pain,fell    Subjective:   HPI    69 YO female presents to immediate care for evaluation of right hand and right forearm pain starting today after slip and fall on concrete. Patient states she braced the fall with her right hand. Denies hitting her head, LOC, chest pain, SOB, abdominal pain. Patient states pain \"is not that bad.\"       Objective:   No pertinent past medical history.            No pertinent past surgical history.              No pertinent social history.            Review of Systems    Positive for stated complaint: right hand pain,fell  Other systems are as noted in HPI.  Constitutional and vital signs reviewed.      All other systems reviewed and negative except as noted above.    Physical Exam     ED Triage Vitals [01/31/24 1409]   /73   Pulse 55   Resp 18   Temp 97.8 °F (36.6 °C)   Temp src Temporal   SpO2 97 %   O2 Device None (Room air)       Current:/73   Pulse 55   Temp 97.8 °F (36.6 °C) (Temporal)   Resp 18   Ht 165.1 cm (5' 5\")   Wt 99.8 kg   LMP 07/01/2013   SpO2 97%   BMI 36.61 kg/m²         Physical Exam  Vitals and nursing note reviewed.   Constitutional:       General: She is not in acute distress.     Appearance: Normal appearance. She is not ill-appearing, toxic-appearing or diaphoretic.   Cardiovascular:      Rate and Rhythm: Normal rate.   Pulmonary:      Effort: Pulmonary effort is normal. No respiratory distress.   Musculoskeletal:      Right forearm: Tenderness (mild generalized tenderness) present. No swelling.      Right hand: Swelling (swelling at the base of the 5th metacarpal) and tenderness (tenderness at 5th metacarpal base) present.   Skin:     Findings: Abrasion (small superficial abrasion at the ulnar aspect of 5th MCP joint) present.   Neurological:      Mental Status: She is alert and oriented to person, place, and time.    Psychiatric:         Mood and Affect: Mood normal.         Behavior: Behavior normal.         ED Course   Labs Reviewed - No data to display  XR FOREARM (2 VIEWS), RIGHT (CPT=73090)    Result Date: 1/31/2024  PROCEDURE:  XR FOREARM (2 VIEWS), RIGHT (CPT=73090)  TECHNIQUE:  Two views were obtained.  COMPARISON:  None.  INDICATIONS:  right hand pain,fell  PATIENT STATED HISTORY: (As transcribed by Technologist)  Pt fell today outside. Pain to right 5th digit.    FINDINGS:  No fracture or dislocation.  Moderate narrowing of elbow joint.            CONCLUSION:  Osteoarthritic changes of elbow.  No acute abnormality.   LOCATION:  [id number]   Dictated by (CST): Rambo Marcus MD on 1/31/2024 at 2:57 PM     Finalized by (CST): Ramob Marcus MD on 1/31/2024 at 2:58 PM       XR HAND (MIN 3 VIEWS), RIGHT (CPT=73130)    Result Date: 1/31/2024  PROCEDURE:  XR HAND (MIN 3 VIEWS), RIGHT (CPT=73130)  TECHNIQUE:  Three views of the right hand were obtained.  COMPARISON:  None.  INDICATIONS:  right hand pain,fell  PATIENT STATED HISTORY: (As transcribed by Technologist)  Pt fell today outside. Pain to right 5th digit.    FINDINGS:  Oblique fracture of proximal 5th metatarsal with slight ulnar displacement of distal segment.  No dislocation.  Osteoarthritic changes of 1st interphalangeal joint.            CONCLUSION:  Mildly displaced fracture of base of 5th metacarpal.   LOCATION:  [id number]   Dictated by (CST): Rambo Marcus MD on 1/31/2024 at 2:55 PM     Finalized by (CST): Rambo Marcus MD on 1/31/2024 at 2:57 PM             MDM      This is a pleasant 67 YO female that presents to immediate care for evaluation of right hand and right forearm pain starting today after slip and fall on concrete. Patient states she braced the fall with her right hand.     Physical exam and XR as above. Ulnar gutter applied by nursing staff.   Boostrix updated.  Ibuprofen 600 mg prescribed for pain, per patient's request.  Patient to continue follow-up  with Orthopedics, information provided on discharge paperwork.        Medical Decision Making  Amount and/or Complexity of Data Reviewed  Radiology: ordered. Decision-making details documented in ED Course.        Disposition and Plan     Clinical Impression:  1. Closed displaced fracture of base of fifth metacarpal bone of right hand, initial encounter    2. Right forearm pain         Disposition:  Discharge  1/31/2024  3:47 pm    Follow-up:  Elizabeth Su, PA  [address] [place] [address]  [phone]    Schedule an appointment as soon as possible for a visit   for hand fracture          Medications Prescribed:  Current Discharge Medication List        START taking these medications    Details   ibuprofen 600 MG Oral Tab Take 1 tablet (600 mg total) by mouth every 8 (eight) hours as needed for Pain. Take with food.  Qty: 15 tablet, Refills: 0

## 2024-02-02 ENCOUNTER — OFFICE VISIT (OUTPATIENT)
Dept: ORTHOPEDICS CLINIC | Facility: CLINIC | Age: 69
End: 2024-02-02
Payer: MEDICARE

## 2024-02-02 VITALS — BODY MASS INDEX: 36.65 KG/M2 | HEIGHT: 65 IN | WEIGHT: 220 LBS

## 2024-02-02 DIAGNOSIS — S62.316A CLOSED DISPLACED FRACTURE OF BASE OF FIFTH METACARPAL BONE OF RIGHT HAND, INITIAL ENCOUNTER: Primary | ICD-10-CM

## 2024-02-02 PROCEDURE — 99203 OFFICE O/P NEW LOW 30 MIN: CPT | Performed by: PHYSICIAN ASSISTANT

## 2024-02-02 RX ORDER — LATANOPROST 50 UG/ML
SOLUTION/ DROPS OPHTHALMIC
COMMUNITY
Start: 2024-01-28

## 2024-02-02 NOTE — H&P
[facility] - ORTHOPEDICS  [address] [place] [address]  [phone]     NEW PATIENT VISIT - HISTORY AND PHYSICAL EXAMINATION     Name: Delia Rebolledo   MRN: [id number]  Date: 2/2/2024     CC: Right hand/wrist pain.     REFERRED BY: Angella Washington DO    HPI:   Delia Rebolledo is a very pleasant 68 year old right-hand dominant female who presents today for evaluation, consultation, and management of right hand injury that occurred after a fall on January 31, 2024.  She fell on concrete after slip and frustration with her fall and did not lose consciousness.  She presented to the emergency department and was referred to our service for further evaluation and management.  She rates her pain to be a 1 out of 10 and notes less than 50% normal function.    PMH:   Past Medical History:   Diagnosis Date    Allergic rhinitis        PAST SURGICAL HX:  Past Surgical History:   Procedure Laterality Date    COLONOSCOPY  9/12/13    repeat in 10 years    EGD  9/12/13    EXCISIONAL BIOSPY RIGHT Right 2016    benign    ZANDER BIOPSY STEREO NODULE 1 SITE RIGHT (CPT=19081) Right 09/2016    2 site    NEEDLE BIOPSY RIGHT Right 10/2019    OTHER SURGICAL HISTORY  10/2017    biopsy of right breast due to calcifications.  Benign       FAMILY HX:  Family History   Problem Relation Age of Onset    Other (allergies) Mother     Other (acid reflux) Mother     Hypertension Maternal Grandmother     Cancer Neg     Heart Disease Neg     Stroke Neg        ALLERGIES:  Patient has no known allergies.    MEDICATIONS:   Current Outpatient Medications   Medication Sig Dispense Refill    latanoprost 0.005 % Ophthalmic Solution       ibuprofen 600 MG Oral Tab Take 1 tablet (600 mg total) by mouth every 8 (eight) hours as needed for Pain. Take with food. 15 tablet 0    gabapentin 100 MG Oral Cap Take 1 capsule (100 mg total) by mouth 3 (three) times daily as needed. 270 capsule 0    atorvastatin 10 MG Oral Tab Take 1  tablet (10 mg total) by mouth daily. 100 tablet 1    alendronate 70 MG Oral Tab TAKE ONE TABLET BY MOUTH WEEKLY 12 tablet 3    oxybutynin ER 5 MG Oral Tablet 24 Hr Take 1 tablet (5 mg total) by mouth daily. 90 tablet 0    Hydroquinone Does not apply Powder ID [id number] Hydroquinone 5% cream Apply to the dark areas on the forehead and cheek once a day at night      Glucosamine-Chondroitin (GLUCOSAMINE CHONDR COMPLEX OR) Take by mouth daily.      Probiotic Product (ALIGN) 4 MG Oral Cap Take by mouth as needed.      acetaminophen 500 MG Oral Tab Take 1 tablet (500 mg total) by mouth every 6 (six) hours as needed for Pain.      Multiple Vitamins-Minerals (MULTIVITAMIN OR) Take by mouth daily.      LORATADINE Take 10 mg by mouth daily as needed.           ROS: A complete 10 point review of systems performed and negative aside from the aforementioned per history of present illness.     SOCIAL HX:  Social History     Occupational History    Occupation: cna   Tobacco Use    Smoking status: Never    Smokeless tobacco: Never   Vaping Use    Vaping Use: Never used   Substance and Sexual Activity    Alcohol use: Not Currently     Comment: approx 1 on holiday    Drug use: No    Sexual activity: Not on file         PE:   Vitals:    02/02/24 1020   Weight: 220 lb (99.8 kg)   Height: 5' 5\" (1.651 m)     Estimated body mass index is 36.61 kg/m² as calculated from the following:    Height as of this encounter: 5' 5\" (1.651 m).    Weight as of this encounter: 220 lb (99.8 kg).    Physical Exam  Constitutional:       Appearance: Normal appearance.   HENT:      Head: Normocephalic and atraumatic.   Eyes:      Extraocular Movements: Extraocular movements intact.   Neck:      Musculoskeletal: Normal range of motion and neck supple.   Cardiovascular:      Pulses: Normal pulses.   Pulmonary:      Effort: Pulmonary effort is normal. No respiratory distress.   Abdominal:      General: There is no distension.   Skin:     General: Skin is warm.       Capillary Refill: Capillary refill takes less than 2 seconds.      Findings: No bruising.   Neurological:      General: No focal deficit present.      Mental Status: Alert.   Psychiatric:         Mood and Affect: Mood normal.     Examination of the right hand demonstrates:     Skin is intact, warm and dry.     Inspection:   Atrophy: none    Effusion: none    Edema: mild    Erythema: none    Hematoma: none      Palpation:   Tenderness at fracture site.     ROM:   Not tested d/t nature of injury and fracture.     Strength: Not tested d/t nature of injury and fracture.     Special Tests:   Not tested d/t nature of injury and fracture.     No obvious peripheral edema noted.   Distal neurovascular exam demonstrates normal perfusion, intact sensation to light touch and full strength.     Examination of the contralateral extremity demonstrates:  No significant atrophy, swelling or effusion. Full range of motion. Neurovascularly intact distally.  The contralateral upper extremity is without limitation in range of motion or strength, no positive provocative maneuvers.       Radiographic Examination/Diagnostics:    I personally viewed, independently interpreted and radiology report was reviewed.    XR FOREARM (2 VIEWS), RIGHT (CPT=73090)    Result Date: 1/31/2024  PROCEDURE:  XR FOREARM (2 VIEWS), RIGHT (CPT=73090)  TECHNIQUE:  Two views were obtained.  COMPARISON:  None.  INDICATIONS:  right hand pain,fell  PATIENT STATED HISTORY: (As transcribed by Technologist)  Pt fell today outside. Pain to right 5th digit.    FINDINGS:  No fracture or dislocation.  Moderate narrowing of elbow joint.            CONCLUSION:  Osteoarthritic changes of elbow.  No acute abnormality.   LOCATION:  [facility]   Dictated by (CST): Rambo Marcus MD on 1/31/2024 at 2:57 PM     Finalized by (CST): Rambo Marcus MD on 1/31/2024 at 2:58 PM       XR HAND (MIN 3 VIEWS), RIGHT (CPT=73130)    Result Date: 1/31/2024  PROCEDURE:  XR HAND (MIN 3 VIEWS), RIGHT  (CPT=73130)  TECHNIQUE:  Three views of the right hand were obtained.  COMPARISON:  None.  INDICATIONS:  right hand pain,fell  PATIENT STATED HISTORY: (As transcribed by Technologist)  Pt fell today outside. Pain to right 5th digit.    FINDINGS:  Oblique fracture of proximal 5th metatarsal with slight ulnar displacement of distal segment.  No dislocation.  Osteoarthritic changes of 1st interphalangeal joint.            CONCLUSION:  Mildly displaced fracture of base of 5th metacarpal.   LOCATION:  [facility]   Dictated by (CST): Rambo Marcus MD on 1/31/2024 at 2:55 PM     Finalized by (CST): Rambo Marcus MD on 1/31/2024 at 2:57 PM         IMPRESSION: Delia Rebolledo is a 68 year old Right hand dominant female right 5th displaced fracture of base metacarpal amenable to conservative treatment.     PLAN:   We had a detailed discussion outlining the etiology, anatomy, pathophysiology, and natural history of the patient's findings. Imaging was reviewed in detail and correlated to a 3-dimensional model of the patient's pathology.     We reviewed the treatment of this disease condition.  Based on the her fracture pattern, I recommend conservative treatment. Exos with sleeve for three weeks, then transition to TKO. Follow up in six weeks with repeat x-rays to begin OT.       FOLLOW-UP:   6 weeks with x-rays.           Sincer JUSTINO Su, [facility], PA-C Orthopedic Surgery / Sports Medicine Specialist  [facility]  [address] [place] [address] [id number] [place] [address].org  [email or web address]  t: [phone]  o: [phone]  f: [phone]    This note was dictated using Dragon software.  While it was briefly proofread prior to completion, some grammatical, spelling, and word choice errors due to dictation may still occur.

## 2024-02-02 NOTE — ADDENDUM NOTE
Addended by: ANGLE SCOTT on: 2/2/2024 11:39 AM     Modules accepted: Orders     Thank you. I called her back. She wanted to ensure that I knew about her appointment and could follow for recommendations and treatment plans after. Questions answered. She has an appointment scheduled with me next month. Thank you!

## 2024-03-04 ENCOUNTER — TELEPHONE (OUTPATIENT)
Dept: ORTHOPEDICS CLINIC | Facility: CLINIC | Age: 69
End: 2024-03-04

## 2024-03-04 NOTE — TELEPHONE ENCOUNTER
Sincer,    **2 forms needing signature**     *The ACKNOWLEDGE button has been moved to the top right ribbon*    Please sign off on form if you agree to: Leave of Absence and RTW due to Closed displaced fracture of base of fifth metacarpal bone of right hand. Start date 01/31/24-pending re-eval 03/15/24.   (place your signature on the first page only)    -From your Inbasket, Highlight the patient and click Chart   -Double click the 03/04/24 Forms Completion telephone encounter  -Scroll down to the Media section   -Click the blue Hyperlink: RAFI VANESSA Su 03/04/24 and RTW VANESSA Su 03/04/24  -Click Acknowledge located in the top right ribbon/menu   -Drag the mouse into the blank space of the document and a + sign will appear. Left click to   electronically sign the document.     Thank you,     Aisha   breastfeeding exclusively

## 2024-03-04 NOTE — TELEPHONE ENCOUNTER
STD forms received. [employer] message sent for auth/BURAK. - Auth completed has wrong fax number (told pt this when I spoke to her on the phones). Logged for processing.

## 2024-03-04 NOTE — TELEPHONE ENCOUNTER
Pt calling asking for status of her STD forms that she turned in. Pt states she had already asked for an extension on her forms and brought them in again on Monday 2/26/23 to have forms completed. Did not find any documentation about forms. Found forms in inbox and will log forms to be completed.     Type of Leave:  STD   Reason for Leave: Closed displaced fracture of base of fifth metacarpal bone of right hand, initial encounter   Start date of leave: 1/31/24   How much time needed?: Pending re eval on 3/15/24.   Forms Due Date: 3/10/24 - already asked for an extension.   Was Fee and Turnaround info Given?: Yes '      Please send pt mychart when forms are completed

## 2024-03-05 NOTE — TELEPHONE ENCOUNTER
RAFI and RTW faxed to [facility] [phone]. Right fax confirmation received. [employer] message sent.

## 2024-03-15 ENCOUNTER — HOSPITAL ENCOUNTER (OUTPATIENT)
Dept: GENERAL RADIOLOGY | Age: 69
Discharge: HOME | End: 2024-03-15
Attending: PHYSICIAN ASSISTANT
Payer: MEDICARE

## 2024-03-15 ENCOUNTER — OFFICE VISIT (OUTPATIENT)
Dept: ORTHOPEDICS CLINIC | Facility: CLINIC | Age: 69
End: 2024-03-15
Payer: COMMERCIAL

## 2024-03-15 DIAGNOSIS — S62.316A CLOSED DISPLACED FRACTURE OF BASE OF FIFTH METACARPAL BONE OF RIGHT HAND, INITIAL ENCOUNTER: ICD-10-CM

## 2024-03-15 DIAGNOSIS — S62.316A CLOSED DISPLACED FRACTURE OF BASE OF FIFTH METACARPAL BONE OF RIGHT HAND, INITIAL ENCOUNTER: Primary | ICD-10-CM

## 2024-03-15 PROCEDURE — 99213 OFFICE O/P EST LOW 20 MIN: CPT | Performed by: PHYSICIAN ASSISTANT

## 2024-03-15 PROCEDURE — 73130 X-RAY EXAM OF HAND: CPT | Performed by: PHYSICIAN ASSISTANT

## 2024-03-15 NOTE — PROGRESS NOTES
[facility] - ORTHOPEDICS  [address] [place] [address]  [phone]       Name: Delia Rebolledo   MRN: [id number]  Date: 3/15/2024     REASON FOR VISIT: Follow up for right 5th displaced fracture of base metacarpal amenable to conservative treatment.     INTERVAL HISTORY:  Delia Rebolledo is a 68 year old female who returns for evaluation of right 5th displaced fracture of base metacarpal amenable to conservative treatment.     To summarize, right hand injury that occurred after a fall on January 31, 2024. She fell on concrete after slip and frustration with her fall and did not lose consciousness. She presented to the emergency department and was referred to our service for further evaluation and management.     At her last visit we recommended EXOs with sleeve and transitioning to TKO.  She presents today for follow-up.  She has noted improvement overall.      ROS: ROS    PE:   There were no vitals filed for this visit.  Estimated body mass index is 36.61 kg/m² as calculated from the following:    Height as of 2/2/24: 5' 5\" (1.651 m).    Weight as of 2/2/24: 220 lb (99.8 kg).    Physical Exam  Constitutional:       Appearance: Normal appearance.   HENT:      Head: Normocephalic and atraumatic.   Eyes:      Extraocular Movements: Extraocular movements intact.   Neck:      Musculoskeletal: Normal range of motion and neck supple.   Cardiovascular:      Pulses: Normal pulses.   Pulmonary:      Effort: Pulmonary effort is normal. No respiratory distress.   Abdominal:      General: There is no distension.   Skin:     General: Skin is warm.      Capillary Refill: Capillary refill takes less than 2 seconds.      Findings: No bruising.   Neurological:      General: No focal deficit present.      Mental Status: She is alert.   Psychiatric:         Mood and Affect: Mood normal.     Examination of the right hand/wrist demonstrates:     Skin is intact, warm and dry.     Inspection:   Atrophy:  none    Effusion: none    Edema: mild    Erythema: none    Hematoma: none    Nail changes:  none    Deformities: none      Palpation:   Radius: none    Ulna: none    Scaphoid: none    Lunate:  none    Triquetrum: none    Pisiform: none    Trapezium: none    Trapezoid: none    Capitate: none  Hamate: none   DRUJ: none   Dorsal Wrist: none   Rodriges Wrist: none   Metacarpals none   DIP: none   PIP: none     ROM:   Wrist: Full and symmetric   Fingers: Full and symmetric     Strength: Not tested d/t nature of injury.     Special Tests:  Deferred to nature of injury.     No obvious peripheral edema noted.   Distal neurovascular exam demonstrates normal perfusion, intact sensation to light touch and full strength.       Radiographic Examination/Diagnostics:    I personally viewed, independently interpreted and radiology report was reviewed.    X-rays Right Hand, 03/15/2024- Evidence of interval healing and callus formation.     IMPRESSION: Delia Rebolledo is a 68 year old female who presented for follow up of  right 5th displaced fracture of base metacarpal amenable to conservative treatment.     PLAN:   We had a detailed discussion outlining the etiology, anatomy, pathophysiology, and natural history of the patient's findings.    We reviewed the treatment of this disease condition.  We recommended physical therapy to aid in strengthening, range of motion, functional improvement, and return to baseline activity.  The patient had opportunity to ask questions and all questions were answered appropriately.    FOLLOW-UP:  8 weeks or as needed.             Elizabeth Su [facility], PA-C Orthopedic Surgery / Sports Medicine Specialist  [facility]  [address] [place] [address]   [email or web address]  [email or web address]  t: [phone]  o: [phone]  f: [phone]    This note was dictated using Dragon software.  While it was briefly proofread prior to completion, some grammatical, spelling, and word  choice errors due to dictation may still occur.

## 2024-03-26 DIAGNOSIS — B02.29 POSTHERPETIC NEURALGIA: ICD-10-CM

## 2024-03-27 RX ORDER — GABAPENTIN 100 MG/1
100 CAPSULE ORAL 3 TIMES DAILY PRN
Qty: 270 CAPSULE | Refills: 0 | Status: SHIPPED | OUTPATIENT
Start: 2024-03-27

## 2024-03-27 NOTE — TELEPHONE ENCOUNTER
Protocol FAIL    Requesting: gabapentin 100 MG Oral Cap     LOV: 12/20/22  RTC: 6 months  Filled: 1/9/24 270 capsule 0 refill  Recent Labs: 3/21/23    Upcoming OV   No future appointments.

## 2024-04-05 ENCOUNTER — MED REC SCAN ONLY (OUTPATIENT)
Dept: ORTHOPEDICS CLINIC | Facility: CLINIC | Age: 69
End: 2024-04-05

## 2024-05-06 ENCOUNTER — OFFICE VISIT (OUTPATIENT)
Dept: ORTHOPEDICS CLINIC | Facility: CLINIC | Age: 69
End: 2024-05-06
Payer: COMMERCIAL

## 2024-05-06 VITALS — HEIGHT: 65 IN | BODY MASS INDEX: 36.65 KG/M2 | WEIGHT: 220 LBS

## 2024-05-06 DIAGNOSIS — S62.316D CLOSED DISPLACED FRACTURE OF BASE OF FIFTH METACARPAL BONE OF RIGHT HAND WITH ROUTINE HEALING, SUBSEQUENT ENCOUNTER: Primary | ICD-10-CM

## 2024-05-06 PROCEDURE — 99213 OFFICE O/P EST LOW 20 MIN: CPT | Performed by: PHYSICIAN ASSISTANT

## 2024-05-06 NOTE — PROGRESS NOTES
[facility] - ORTHOPEDICS  [address] [place] [address]  [phone]       Name: Delia Rebolledo   MRN: [id number]  Date: 5/6/2024     REASON FOR VISIT: Follow up for right 5th displaced fracture of base metacarpal amenable to conservative treatment.     INTERVAL HISTORY:  Delia Rebolledo is a 68 year old female who returns for evaluation of right 5th displaced fracture of base metacarpal amenable to conservative treatment.     To summarize, right hand injury that occurred after a fall on January 31, 2024. She fell on concrete after slip and frustration with her fall and did not lose consciousness. She presented to the emergency department and was referred to our service for further evaluation and management. Overall doing well.      She rates her pain to be a 0/10.       ROS: ROS    PE:   Vitals:    05/06/24 0818   Weight: 220 lb (99.8 kg)   Height: 5' 5\" (1.651 m)     Estimated body mass index is 36.61 kg/m² as calculated from the following:    Height as of this encounter: 5' 5\" (1.651 m).    Weight as of this encounter: 220 lb (99.8 kg).    Physical Exam  Constitutional:       Appearance: Normal appearance.   HENT:      Head: Normocephalic and atraumatic.   Eyes:      Extraocular Movements: Extraocular movements intact.   Neck:      Musculoskeletal: Normal range of motion and neck supple.   Cardiovascular:      Pulses: Normal pulses.   Pulmonary:      Effort: Pulmonary effort is normal. No respiratory distress.   Abdominal:      General: There is no distension.   Skin:     General: Skin is warm.      Capillary Refill: Capillary refill takes less than 2 seconds.      Findings: No bruising.   Neurological:      General: No focal deficit present.      Mental Status: She is alert.   Psychiatric:         Mood and Affect: Mood normal.     Examination of the right hand/wrist demonstrates:     Skin is intact, warm and dry.     Inspection:   Atrophy: none    Effusion: none    Edema: none     Erythema: none    Hematoma: none    Nail changes:  none    Deformities: none      Palpation:   Radius: none    Ulna: none    Scaphoid: none    Lunate:  none    Triquetrum: none    Pisiform: none    Trapezium: none    Trapezoid: none    Capitate: none  Hamate: none   DRUJ: none   Dorsal Wrist: none   Rodriges Wrist: none   Metacarpals none   DIP: none   PIP: none     ROM:   Wrist: Full and symmetric   Fingers: Full and symmetric     Strength: normal     Special Tests:   Median Nerve: Negative  Ulnar Nerve: Negative   Radial Nerve:  Negative     No obvious peripheral edema noted.   Distal neurovascular exam demonstrates normal perfusion, intact sensation to light touch and full strength.         IMPRESSION: Delia Rebolledo is a 68 year old female who presented for follow up of  right 5th displaced fracture of base metacarpal amenable to conservative treatment.     PLAN:   We had a detailed discussion outlining the etiology, anatomy, pathophysiology, and natural history of the patient's findings.    The patient notes near complete resolution of symptoms, and return to full baseline function. The patient can follow up with our office as needed. The patient had the opportunity to ask questions, and all questions were answered appropriately.       FOLLOW-UP:  Return to clinic on an as needed basis.             Elizabeth Su [facility], PA-C Orthopedic Surgery / Sports Medicine Specialist  [facility]  [address] [place] [address] [id number] [place] [address].org  [email or web address]  t: [phone]  o: [phone]  f: [phone]    This note was dictated using Dragon software.  While it was briefly proofread prior to completion, some grammatical, spelling, and word choice errors due to dictation may still occur.

## 2024-06-06 DIAGNOSIS — B02.29 POSTHERPETIC NEURALGIA: ICD-10-CM

## 2024-06-06 RX ORDER — GABAPENTIN 100 MG/1
100 CAPSULE ORAL 3 TIMES DAILY PRN
Qty: 270 CAPSULE | Refills: 0 | Status: SHIPPED | OUTPATIENT
Start: 2024-06-06 | End: 2024-08-14

## 2024-06-06 NOTE — TELEPHONE ENCOUNTER
Protocol FAIL    Requesting: gabapentin 100 MG Oral Cap     LOV: 12/20/22  RTC: 6 MONTHS  Filled: 3/27/24 270 CAPSULE 0 REFILL  Recent Labs: 3/21/23    Upcoming OV   No future appointments.

## 2024-06-07 DIAGNOSIS — B02.29 POSTHERPETIC NEURALGIA: ICD-10-CM

## 2024-06-07 RX ORDER — GABAPENTIN 100 MG/1
100 CAPSULE ORAL 3 TIMES DAILY PRN
Qty: 270 CAPSULE | Refills: 0 | OUTPATIENT
Start: 2024-06-07

## 2024-06-22 DIAGNOSIS — E78.2 MIXED HYPERLIPIDEMIA: ICD-10-CM

## 2024-06-24 RX ORDER — ATORVASTATIN CALCIUM 10 MG/1
10 TABLET, FILM COATED ORAL DAILY
Qty: 100 TABLET | Refills: 0 | Status: SHIPPED | OUTPATIENT
Start: 2024-06-24

## 2024-06-24 NOTE — TELEPHONE ENCOUNTER
Requesting Atorvastatin 10mg  Protocol FAIL  Last in person visit 12/20/22  Last virtual visit 4/22/23  RTC 1 month  Last labs 3/21/23    Future Appointments   Date Time Provider Department Center   7/3/2024 10:00 AM Angella Washington,  [facility] [place]

## 2024-07-03 ENCOUNTER — OFFICE VISIT (OUTPATIENT)
Dept: INTERNAL MEDICINE CLINIC | Facility: CLINIC | Age: 69
End: 2024-07-03
Payer: COMMERCIAL

## 2024-07-03 ENCOUNTER — LAB ENCOUNTER (OUTPATIENT)
Dept: LAB | Age: 69
End: 2024-07-03
Attending: INTERNAL MEDICINE
Payer: MEDICARE

## 2024-07-03 VITALS
HEIGHT: 65 IN | OXYGEN SATURATION: 98 % | DIASTOLIC BLOOD PRESSURE: 72 MMHG | RESPIRATION RATE: 16 BRPM | HEART RATE: 53 BPM | TEMPERATURE: 97 F | WEIGHT: 240 LBS | SYSTOLIC BLOOD PRESSURE: 130 MMHG | BODY MASS INDEX: 39.99 KG/M2

## 2024-07-03 DIAGNOSIS — N18.31 STAGE 3A CHRONIC KIDNEY DISEASE (HCC): ICD-10-CM

## 2024-07-03 DIAGNOSIS — Z12.11 SCREENING FOR COLON CANCER: ICD-10-CM

## 2024-07-03 DIAGNOSIS — Z00.00 ROUTINE PHYSICAL EXAMINATION: Primary | ICD-10-CM

## 2024-07-03 DIAGNOSIS — Z12.31 ENCOUNTER FOR SCREENING MAMMOGRAM FOR MALIGNANT NEOPLASM OF BREAST: ICD-10-CM

## 2024-07-03 DIAGNOSIS — E55.9 VITAMIN D DEFICIENCY: ICD-10-CM

## 2024-07-03 DIAGNOSIS — E78.2 MIXED HYPERLIPIDEMIA: ICD-10-CM

## 2024-07-03 DIAGNOSIS — B02.29 POST HERPETIC NEURALGIA: ICD-10-CM

## 2024-07-03 DIAGNOSIS — R41.3 MEMORY CHANGE: ICD-10-CM

## 2024-07-03 DIAGNOSIS — M81.0 AGE-RELATED OSTEOPOROSIS WITHOUT CURRENT PATHOLOGICAL FRACTURE: ICD-10-CM

## 2024-07-03 DIAGNOSIS — E66.01 CLASS 3 SEVERE OBESITY WITHOUT SERIOUS COMORBIDITY WITH BODY MASS INDEX (BMI) OF 40.0 TO 44.9 IN ADULT, UNSPECIFIED OBESITY TYPE (HCC): ICD-10-CM

## 2024-07-03 DIAGNOSIS — R73.03 PREDIABETES: ICD-10-CM

## 2024-07-03 LAB
ALT SERPL-CCNC: 12 U/L
ANION GAP SERPL CALC-SCNC: 8 MMOL/L (ref 0–18)
AST SERPL-CCNC: 27 U/L (ref ?–34)
BASOPHILS # BLD AUTO: 0.04 X10(3) UL (ref 0–0.2)
BASOPHILS NFR BLD AUTO: 0.8 %
BUN BLD-MCNC: 11 MG/DL (ref 9–23)
BUN/CREAT SERPL: 8.9 (ref 10–20)
CALCIUM BLD-MCNC: 10 MG/DL (ref 8.7–10.4)
CHLORIDE SERPL-SCNC: 108 MMOL/L (ref 98–112)
CHOLEST SERPL-MCNC: 169 MG/DL (ref ?–200)
CO2 SERPL-SCNC: 27 MMOL/L (ref 21–32)
CREAT BLD-MCNC: 1.24 MG/DL
DEPRECATED RDW RBC AUTO: 48.3 FL (ref 35.1–46.3)
EGFRCR SERPLBLD CKD-EPI 2021: 48 ML/MIN/1.73M2 (ref 60–?)
EOSINOPHIL # BLD AUTO: 0.15 X10(3) UL (ref 0–0.7)
EOSINOPHIL NFR BLD AUTO: 3 %
ERYTHROCYTE [DISTWIDTH] IN BLOOD BY AUTOMATED COUNT: 14.3 % (ref 11–15)
EST. AVERAGE GLUCOSE BLD GHB EST-MCNC: 108 MG/DL (ref 68–126)
FASTING PATIENT LIPID ANSWER: YES
FASTING STATUS PATIENT QL REPORTED: YES
GLUCOSE BLD-MCNC: 90 MG/DL (ref 70–99)
HBA1C MFR BLD: 5.4 % (ref ?–5.7)
HCT VFR BLD AUTO: 41.4 %
HDLC SERPL-MCNC: 61 MG/DL (ref 40–59)
HGB BLD-MCNC: 13.5 G/DL
IMM GRANULOCYTES # BLD AUTO: 0.01 X10(3) UL (ref 0–1)
IMM GRANULOCYTES NFR BLD: 0.2 %
LDLC SERPL CALC-MCNC: 93 MG/DL (ref ?–100)
LYMPHOCYTES # BLD AUTO: 1.92 X10(3) UL (ref 1–4)
LYMPHOCYTES NFR BLD AUTO: 38.2 %
MCH RBC QN AUTO: 29.9 PG (ref 26–34)
MCHC RBC AUTO-ENTMCNC: 32.6 G/DL (ref 31–37)
MCV RBC AUTO: 91.6 FL
MONOCYTES # BLD AUTO: 0.32 X10(3) UL (ref 0.1–1)
MONOCYTES NFR BLD AUTO: 6.4 %
NEUTROPHILS # BLD AUTO: 2.59 X10 (3) UL (ref 1.5–7.7)
NEUTROPHILS # BLD AUTO: 2.59 X10(3) UL (ref 1.5–7.7)
NEUTROPHILS NFR BLD AUTO: 51.4 %
NONHDLC SERPL-MCNC: 108 MG/DL (ref ?–130)
OSMOLALITY SERPL CALC.SUM OF ELEC: 295 MOSM/KG (ref 275–295)
PLATELET # BLD AUTO: 246 10(3)UL (ref 150–450)
POTASSIUM SERPL-SCNC: 4.3 MMOL/L (ref 3.5–5.1)
RBC # BLD AUTO: 4.52 X10(6)UL
SODIUM SERPL-SCNC: 143 MMOL/L (ref 136–145)
TRIGL SERPL-MCNC: 83 MG/DL (ref 30–149)
TSI SER-ACNC: 1.77 MIU/ML (ref 0.55–4.78)
VIT D+METAB SERPL-MCNC: 38.4 NG/ML (ref 30–100)
VLDLC SERPL CALC-MCNC: 13 MG/DL (ref 0–30)
WBC # BLD AUTO: 5 X10(3) UL (ref 4–11)

## 2024-07-03 PROCEDURE — 36415 COLL VENOUS BLD VENIPUNCTURE: CPT | Performed by: INTERNAL MEDICINE

## 2024-07-03 PROCEDURE — 85025 COMPLETE CBC W/AUTO DIFF WBC: CPT | Performed by: INTERNAL MEDICINE

## 2024-07-03 PROCEDURE — 84450 TRANSFERASE (AST) (SGOT): CPT | Performed by: INTERNAL MEDICINE

## 2024-07-03 PROCEDURE — 84460 ALANINE AMINO (ALT) (SGPT): CPT | Performed by: INTERNAL MEDICINE

## 2024-07-03 PROCEDURE — 82306 VITAMIN D 25 HYDROXY: CPT

## 2024-07-03 PROCEDURE — 84443 ASSAY THYROID STIM HORMONE: CPT | Performed by: INTERNAL MEDICINE

## 2024-07-03 PROCEDURE — 99214 OFFICE O/P EST MOD 30 MIN: CPT | Performed by: INTERNAL MEDICINE

## 2024-07-03 PROCEDURE — 80061 LIPID PANEL: CPT | Performed by: INTERNAL MEDICINE

## 2024-07-03 PROCEDURE — 83036 HEMOGLOBIN GLYCOSYLATED A1C: CPT | Performed by: INTERNAL MEDICINE

## 2024-07-03 PROCEDURE — 80048 BASIC METABOLIC PNL TOTAL CA: CPT | Performed by: INTERNAL MEDICINE

## 2024-07-03 PROCEDURE — 99397 PER PM REEVAL EST PAT 65+ YR: CPT | Performed by: INTERNAL MEDICINE

## 2024-07-03 RX ORDER — ALENDRONATE SODIUM 70 MG/1
TABLET ORAL
Qty: 12 TABLET | Refills: 0 | Status: SHIPPED | OUTPATIENT
Start: 2024-07-03

## 2024-07-03 RX ORDER — TIRZEPATIDE 2.5 MG/.5ML
2.5 INJECTION, SOLUTION SUBCUTANEOUS WEEKLY
Qty: 2 ML | Refills: 0 | Status: SHIPPED | OUTPATIENT
Start: 2024-07-03

## 2024-07-03 NOTE — PATIENT INSTRUCTIONS
Continue to exercise at least 150 minutes a week and Eat a plant based diet     Please take 2000 IU of vitamin D daily for life to keep your bones strong    I have ordered an MRI of the brain  I have referred you to neuropsych testing    We will see if zepbound is covered  Please schedule your mammogram and do the FIT test for colon cancer     See me back in 6 weeks     Manolo Melendrez Psy.D  [address]  [place] [address]  Phone:  [phone]      [facility]  [address] [facility] [address]  Ph: [phone]      Neuropsychological services- Dr Fortune and Dr Calderón  [address] [facility] [address]  Ph: [phone]      Neurohealth- Dr Joe  [address]  [place] [address]  Phone: [phone]      [facility]  [address], [address], [place] [address]  Phone: [phone]

## 2024-07-03 NOTE — PROGRESS NOTES
Patient Office Visit    ASSESSMENT AND PLAN:   1. Routine physical examination  Note: Continue to exercise at least 150 minutes a week and Eat a plant based diet. Please take 2000 IU of vitamin D daily for life to keep your bones strong. Please see your dentist every 6 months. Continue with regular eye exams   - ALT(SGPT)  - AST (SGOT)  - Basic Metabolic Panel (8)  - Lipid Panel  - CBC With Differential With Platelet  - TSH W Reflex To Free T4    2. Stage 3a chronic kidney disease (HCC)  Note: will repeat labs.     3. Class 3 severe obesity without serious comorbidity with body mass index (BMI) of 40.0 to 44.9 in adult, unspecified obesity type (HCC)  Note: discussed the side effects with the patient. No personal or family history of medullary thyroid cancer or pancreatitis. If zepbound not covered then will try wegovy. Needs to work on walking and dietary changes   - Tirzepatide-Weight Management (ZEPBOUND) 2.5 MG/0.5ML Subcutaneous Solution Auto-injector; Inject 2.5 mg into the skin once a week.  Dispense: 2 mL; Refill: 0    4. Mixed hyperlipidemia  Note: continue statin     5. Age-related osteoporosis without current pathological fracture  Note: will repeat bone density. Continue alendronate. Not interested in prolia at this time  - XR DEXA BONE DENSITOMETRY (CPT=77080); Future    6. Post herpetic neuralgia  Note: we discussed about weaning off gabapentin, but patient declined. Unlikely the low dose is causing memory issues     7. Encounter for screening mammogram for malignant neoplasm of breast  - [facility] SMITA 2D+3D SCREENING BILAT (CPT=77067/29462); Future  - Basic Metabolic Panel (8)  - TSH W Reflex To Free T4    8. Memory change  Note: mini mental status exam was normal. Recommended neuropsych testing. Will obtain blood tests and MRI of the brain   - MRI BRAIN (CPT=70551); Future  - Neuropsych testing    9. Screening for colon cancer  - Occult Blood, Fecal, Immunoassay (Blue cards) [E]    10. Vitamin D  deficiency  - Vitamin D; Future    11. Prediabetes  - Hemoglobin A1C [E]    RTC in 6 weeks       Patient/Caregiver Education: Patient/Caregiver Education: There are no barriers to learning. Medical education done. Outcome: Patient verbalizes understanding. Patient is notified to call with any questions, complications, allergies, or worsening or changing symptoms.  Patient is to call with any side effects or complications from the treatments as a result of today.      Reviewed Past Medical History and   Patient Active Problem List   Diagnosis    Stage 3a chronic kidney disease (HCC)    Class 3 severe obesity without serious comorbidity with body mass index (BMI) of 40.0 to 44.9 in adult (HCC)    Post herpetic neuralgia    Age-related osteoporosis without current pathological fracture    Hyperlipidemia       Orders Placed This Encounter   Procedures    Occult Blood, Fecal, Immunoassay (Blue cards) [E]     Order Specific Question:   Release to patient     Answer:   Immediate    ALT(SGPT)    AST (SGOT)    Basic Metabolic Panel (8)    Lipid Panel    CBC With Differential With Platelet    TSH W Reflex To Free T4    Vitamin D     Standing Status:   Future     Standing Expiration Date:   7/3/2025     Order Specific Question:   Please pick the scenario that best fits the purpose for ordering this test     Answer:   General Screening/Vit D deficiency (25-Hydroxy)     Order Specific Question:   Release to patient     Answer:   Immediate    Hemoglobin A1C [E]     Order Specific Question:   Release to patient     Answer:   Immediate     Requested Prescriptions     Signed Prescriptions Disp Refills    Tirzepatide-Weight Management (ZEPBOUND) 2.5 MG/0.5ML Subcutaneous Solution Auto-injector 2 mL 0     Sig: Inject 2.5 mg into the skin once a week.         Angella Washington DO  CC:  Chief Complaint   Patient presents with    Follow - Up         HPI:   Delia Rebolledo is a 68 year old female who presents for a routine physical and to  discuss the following concerns. Here with her daughter    Memory change: yesterday the patient's daughter was very concerned because the daughter said \" you did not tell me it is April fools day.\"  Yesterday was July 2.  Patient states that she was watching something on TV that they were mentioning April Fools day.  The daughter has noticed some forgetfulness here and there but not very major.  The daughter has a family member who has dementia and she wants to make sure that her mother does not get dementia as well.  The patient continues to work regularly but is not very active.  She mainly stays in her room if she is not working.  She was at home for 3 months after breaking her hand after a fall.  The patient does not feel sad or depressed but does have some family stress in the past that she thinks about often per the daughter.  Right hip pain: Has been going on and off for the past few months and she was concerned it was due to alendronate so she stopped it recently.  She is wondering if she can restart it again.  Obesity: Patient does not have any family or personal history of medullary thyroid cancer or pancreatitis.  She would like to try GLP-1 inhibitors.  Postherpetic neuralgia: The patient wants to continue the gabapentin as it really helps.  The daughter was concerned that it could be causing her memory loss    Past Medical History:    Allergic rhinitis    Anxiety    Depression    Esophageal reflux    Hyperlipidemia       Past Surgical History:   Procedure Laterality Date    Colonoscopy  09/12/2013    repeat in 10 years    Colonoscopy  Fall of 2016    Egd  09/12/2013    Excisional biospy right Right 2016    benign    Rhys biopsy stereo nodule 1 site right (cpt=19081) Right 09/2016    2 site    Needle biopsy right Right 10/2019    Other surgical history  10/2017    biopsy of right breast due to calcifications.  Benign       Social History:  Social History     Socioeconomic History    Marital status: Single    Occupational History    Occupation: cna   Tobacco Use    Smoking status: Never    Smokeless tobacco: Never   Vaping Use    Vaping status: Never Used   Substance and Sexual Activity    Alcohol use: Not Currently     Comment: approx 1 on holiday    Drug use: No   Other Topics Concern    Caffeine Concern Yes    Exercise Yes    Seat Belt No    Special Diet No    Stress Concern No    Weight Concern No     Family History:  Family History   Problem Relation Age of Onset    Other (allergies) Mother     Other (acid reflux) Mother     Hypertension Maternal Grandmother     Cancer Neg     Heart Disease Neg     Stroke Neg      Allergies:  No Known Allergies  Current Meds:  Current Outpatient Medications on File Prior to Visit   Medication Sig Dispense Refill    ATORVASTATIN 10 MG Oral Tab TAKE ONE TABLET BY MOUTH ONE TIME DAILY 100 tablet 0    GABAPENTIN 100 MG Oral Cap Take 1 capsule (100 mg total) by mouth 3 (three) times daily as needed. 270 capsule 0    latanoprost 0.005 % Ophthalmic Solution       ibuprofen 600 MG Oral Tab Take 1 tablet (600 mg total) by mouth every 8 (eight) hours as needed for Pain. Take with food. 15 tablet 0    alendronate 70 MG Oral Tab TAKE ONE TABLET BY MOUTH WEEKLY 12 tablet 3    oxybutynin ER 5 MG Oral Tablet 24 Hr Take 1 tablet (5 mg total) by mouth daily. 90 tablet 0    Hydroquinone Does not apply Powder ID [id number] Hydroquinone 5% cream Apply to the dark areas on the forehead and cheek once a day at night      Glucosamine-Chondroitin (GLUCOSAMINE CHONDR COMPLEX OR) Take by mouth daily.      Probiotic Product (ALIGN) 4 MG Oral Cap Take by mouth as needed.      acetaminophen 500 MG Oral Tab Take 1 tablet (500 mg total) by mouth every 6 (six) hours as needed for Pain.      Multiple Vitamins-Minerals (MULTIVITAMIN OR) Take by mouth daily.      LORATADINE Take 10 mg by mouth daily as needed.         No current facility-administered medications on file prior to visit.         REVIEW OF SYSTEMS    Constitutional: no fatigue normal energy no weight changes   HENT: normal sinuses and no mouth issues   Eyes: . normal vision no eye pain   Respiratory: normal respirations no cough   Cardiovascular: no CP, or palpitations   Gastrointestinal: normal bowels and no abd pains   Genitourinary:  normal urination no hematuria, no frequency   Musculoskeletal: no pains in arms/legs, normal range of motion   Skin: no rashes or skin lesions that are new   Neurological: As above  Hematological:  no bruises or bleeding   Psychiatric/Behavioral: normal mood no anxiety normal behavior     /72 (BP Location: Left arm, Patient Position: Sitting, Cuff Size: adult)   Pulse 53   Temp 97.2 °F (36.2 °C) (Temporal)   Resp 16   Ht 5' 5\" (1.651 m)   Wt 240 lb (108.9 kg)   LMP 07/01/2013   SpO2 98%   BMI 39.94 kg/m²     PHYSICAL EXAM:   Constitutional: Vital signs reviewed as noted, well developed, in no acute distress.   HENT: NCAT, bilateral ear canal and tympanic membrane appear normal  Eyes: pupils reactive bilaterally  Neck: No thyroidmegaly  Cardiovascular: nl s1 s2 no m/r/g  Pulmonary/Chest: CTA bilaterally with no wheezes  Abdominal: Soft NT normal Bowel sounds  Musculoskeletal: Decrease in range of motion of the right hip and mild crepitus noted in the knees bilaterally  Extremities: no pedal edema   Neurological:  no weakness in UE and LE, reflexes are normal, CN 2-12 are intact, normal gait, sensation intact to light touch, Mini-Mental status exam was 30 out of 30  Skin: no rashes or bruises on visualized skin, not undressed   Psychiatric:normal mood

## 2024-07-03 NOTE — TELEPHONE ENCOUNTER
Protocol FAIL    Requesting: alendronate 70 MG Oral Tab     LOV: 7/3/24  RTC: 6 WEEKS  Filled: 5/18/23 12 TABLET 3 REFILL  Recent Labs: 7/3/24    Upcoming OV   No future appointments.

## 2024-07-16 ENCOUNTER — TELEPHONE (OUTPATIENT)
Dept: INTERNAL MEDICINE CLINIC | Facility: CLINIC | Age: 69
End: 2024-07-16

## 2024-07-16 DIAGNOSIS — R41.3 MEMORY CHANGE: ICD-10-CM

## 2024-07-16 DIAGNOSIS — M81.0 AGE-RELATED OSTEOPOROSIS WITHOUT CURRENT PATHOLOGICAL FRACTURE: Primary | ICD-10-CM

## 2024-07-16 NOTE — TELEPHONE ENCOUNTER
Patients daughter called in asking for referral to following facility recommended by provider.     Patient requesting referral: [facility]    Is insurance accurate in Epic and Verified: YES  Is there already an open/pending/approved referral: NO    Specialty: [facility]  Refer to Provider (Physician's first and last name - referral needs to be under collaborating MD's name):   [facility]  [address] [facility] [address]  Ph: [phone]    Fax: [phone]  Diagnosis or reason they are being seen: New Pt     If Requesting Out of Network Provider, please provide reason: Per Daughter No     Patient Call Back Number:

## 2024-07-17 NOTE — TELEPHONE ENCOUNTER
Patient requesting referral to recommended facility.     Last OV: 7/3/24    [facility]  [address] [place] [id number]  Ph: [phone]    Order pended, please sign if appropriate. Thank you.

## 2024-07-24 ENCOUNTER — HOSPITAL ENCOUNTER (OUTPATIENT)
Dept: MAMMOGRAPHY | Age: 69
Discharge: HOME | End: 2024-07-24
Attending: INTERNAL MEDICINE
Payer: MEDICARE

## 2024-07-24 DIAGNOSIS — Z12.31 ENCOUNTER FOR SCREENING MAMMOGRAM FOR MALIGNANT NEOPLASM OF BREAST: ICD-10-CM

## 2024-07-24 PROCEDURE — 77067 SCR MAMMO BI INCL CAD: CPT | Performed by: INTERNAL MEDICINE

## 2024-07-24 PROCEDURE — 77063 BREAST TOMOSYNTHESIS BI: CPT | Performed by: INTERNAL MEDICINE

## 2024-08-02 ENCOUNTER — TELEPHONE (OUTPATIENT)
Dept: INTERNAL MEDICINE CLINIC | Facility: CLINIC | Age: 69
End: 2024-08-02

## 2024-08-02 NOTE — TELEPHONE ENCOUNTER
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 Please call back and advise.

## 2024-08-02 NOTE — TELEPHONE ENCOUNTER
Patient's daughter Robina called her Mother works at a nursing home.    They are asking for a letter stating patient should be excused from working with Covid positive patient's until she is able to complete upcoming scheduled imaging. Wants to be excused through 9/1.     Letter pended.

## 2024-08-07 ENCOUNTER — HOSPITAL ENCOUNTER (OUTPATIENT)
Dept: BONE DENSITY | Age: 69
Discharge: HOME | End: 2024-08-07
Attending: INTERNAL MEDICINE
Payer: MEDICARE

## 2024-08-07 DIAGNOSIS — M81.0 AGE-RELATED OSTEOPOROSIS WITHOUT CURRENT PATHOLOGICAL FRACTURE: ICD-10-CM

## 2024-08-07 PROCEDURE — 77080 DXA BONE DENSITY AXIAL: CPT | Performed by: INTERNAL MEDICINE

## 2024-08-07 NOTE — TELEPHONE ENCOUNTER
LVM and sent MCM for clarity. Pt active on mcm. Looks like pt went for DEXA scan today.   Letter pended still.  Sent mcm to pt asking if still needs letter since she just had the DEXA scan done. Hold to make sure mcm read.

## 2024-08-13 DIAGNOSIS — B02.29 POSTHERPETIC NEURALGIA: ICD-10-CM

## 2024-08-14 RX ORDER — GABAPENTIN 100 MG/1
100 CAPSULE ORAL 3 TIMES DAILY PRN
Qty: 270 CAPSULE | Refills: 0 | Status: SHIPPED | OUTPATIENT
Start: 2024-08-14

## 2024-08-14 NOTE — TELEPHONE ENCOUNTER
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 Letter is pended, are willing to sign this?

## 2024-08-28 ENCOUNTER — HOSPITAL ENCOUNTER (OUTPATIENT)
Dept: MRI IMAGING | Age: 69
Discharge: HOME | End: 2024-08-28
Attending: INTERNAL MEDICINE
Payer: MEDICARE

## 2024-08-28 DIAGNOSIS — R41.3 MEMORY CHANGE: ICD-10-CM

## 2024-08-28 PROCEDURE — 70551 MRI BRAIN STEM W/O DYE: CPT | Performed by: INTERNAL MEDICINE

## 2024-09-19 ENCOUNTER — PATIENT MESSAGE (OUTPATIENT)
Dept: INTERNAL MEDICINE CLINIC | Facility: CLINIC | Age: 69
End: 2024-09-19

## 2024-09-19 DIAGNOSIS — R41.3 MEMORY LOSS: Primary | ICD-10-CM

## 2024-09-19 DIAGNOSIS — R20.2 TINGLING: ICD-10-CM

## 2024-09-23 DIAGNOSIS — M81.0 AGE-RELATED OSTEOPOROSIS WITHOUT CURRENT PATHOLOGICAL FRACTURE: ICD-10-CM

## 2024-09-24 RX ORDER — ALENDRONATE SODIUM 70 MG/1
TABLET ORAL
Qty: 12 TABLET | Refills: 0 | Status: SHIPPED | OUTPATIENT
Start: 2024-09-24

## 2024-09-24 NOTE — TELEPHONE ENCOUNTER
From: Delia Rebolledo  To: Angella Washington  Sent: 9/19/2024 2:08 PM CDT  Subject: Shingles question     MRI results and shingles questions   
SV: Pt requesting if another shingles vaccine is needed (last admin date: 9/13/21, second dose)? And any further steps for \"tingling sensation in head\"? Pt still taking gabapentin. Please advise, TY!    LOV: 7/3/24:    6. Post herpetic neuralgia  Note: we discussed about weaning off gabapentin, but patient declined. Unlikely the low dose is causing memory issues     8. Memory change  Note: mini mental status exam was normal. Recommended neuropsych testing. Will obtain blood tests and MRI of the brain   - MRI BRAIN (CPT=70551); Future  - Neuropsych testing  
Never smoker

## 2024-09-24 NOTE — TELEPHONE ENCOUNTER
Requesting    Name from pharmacy: Alendronate Sodium 70 Mg Tab Nort         Will file in chart as: ALENDRONATE 70 MG Oral Tab    Sig: TAKE 1 TABLET BY MOUTH WEEKLY    Disp: 12 tablet    Refills: 0    Start: 9/23/2024    Class: Normal    Non-formulary For: Age-related osteoporosis without current pathological fracture    Last ordered: 2 months ago (7/3/2024) by Angella Washington DO    Last refill: 7/3/2024    Rx #: [id number]    Osteoporosis Medication Protocol [id number]/23/2024 03:00 AM   Protocol Details CMP within the past 12 months    In person appointment or virtual visit in the past 6 mos or appointment in next 3 mos    DEXA scan within past 2 years    Calcium level between 8.3 and 10.3    GFR level greater than 35      To be filled at: [id number] - [place] - [address] [place] [address] [phone], [phone]     LOV: 07/03/24 w/ SV  RTC: 6 weeks  Last Relevant Labs: 07/03/24  Filled: 07/03/24 #12 with 0 refills    No future appointments.

## 2024-09-29 DIAGNOSIS — E78.2 MIXED HYPERLIPIDEMIA: ICD-10-CM

## 2024-10-01 RX ORDER — ATORVASTATIN CALCIUM 10 MG/1
10 TABLET, FILM COATED ORAL DAILY
Qty: 100 TABLET | Refills: 0 | Status: SHIPPED | OUTPATIENT
Start: 2024-10-01

## 2024-10-01 NOTE — TELEPHONE ENCOUNTER
Atorvastatin Calcium 10 Mg Tab Nort          Will file in chart as: ATORVASTATIN 10 MG Oral Tab    Sig: TAKE ONE TABLET BY MOUTH ONE TIME DAILY    Disp: 100 tablet    Refills: 0    Start: 9/29/2024    Class: Normal    Non-formulary For: Mixed hyperlipidemia    Last ordered: 3 months ago (6/24/2024) by Angella Washington DO    Last refill: 6/24/2024    Rx #: [id number]    Cholesterol Medication Protocol [id number]/29/2024 03:01 AM   Protocol Details ALT < 80    ALT resulted within past year    Lipid panel within past 12 months    In person appointment or virtual visit in the past 12 mos or appointment in next 3 mos      LOV 7/3/24  RTC 6 weeks  Filled 6/24/24 100 tabs 0 refills   Last labs 7/3/24  No future appointments.

## 2024-10-24 DIAGNOSIS — B02.29 POSTHERPETIC NEURALGIA: ICD-10-CM

## 2024-10-24 RX ORDER — GABAPENTIN 100 MG/1
100 CAPSULE ORAL 3 TIMES DAILY PRN
Qty: 270 CAPSULE | Refills: 0 | Status: SHIPPED | OUTPATIENT
Start: 2024-10-24

## 2024-10-24 NOTE — TELEPHONE ENCOUNTER
PASS  GABAPENTIN 100 MG Oral Cap 8/14/24 270 CAPSULE     LOV 7/3/24  FU 6 WEEKS      6. Post herpetic neuralgia  Note: we discussed about weaning off gabapentin, but patient declined. Unlikely the low dose is causing memory issues     Future Appointments   Date Time Provider Department Center   12/18/2024  3:00 PM Aniket Noel MD [facility] [place]

## 2024-10-25 ENCOUNTER — PATIENT MESSAGE (OUTPATIENT)
Dept: INTERNAL MEDICINE CLINIC | Facility: CLINIC | Age: 69
End: 2024-10-25

## 2024-10-25 DIAGNOSIS — B02.29 POST HERPETIC NEURALGIA: Primary | ICD-10-CM

## 2024-10-30 RX ORDER — PREGABALIN 25 MG/1
25 CAPSULE ORAL 2 TIMES DAILY
Qty: 180 CAPSULE | Refills: 0 | Status: SHIPPED | OUTPATIENT
Start: 2024-10-30

## 2024-11-04 ENCOUNTER — TELEPHONE (OUTPATIENT)
Dept: INTERNAL MEDICINE CLINIC | Facility: CLINIC | Age: 69
End: 2024-11-04

## 2024-11-04 NOTE — TELEPHONE ENCOUNTER
[facility]. Went to start PA for Pregabalin and PA is not required with insurance.

## 2024-12-14 DIAGNOSIS — M81.0 AGE-RELATED OSTEOPOROSIS WITHOUT CURRENT PATHOLOGICAL FRACTURE: ICD-10-CM

## 2024-12-14 RX ORDER — ALENDRONATE SODIUM 70 MG/1
TABLET ORAL
Qty: 12 TABLET | Refills: 0 | Status: SHIPPED | OUTPATIENT
Start: 2024-12-14

## 2024-12-14 NOTE — TELEPHONE ENCOUNTER
Protocol FAIL    Requesting: ALENDRONATE 70 MG Oral Tab     LOV: 7/3/24  RTC: 6 WEEKS  Filled: 9/24/24 12 TABLET 0 REFILL  Recent Labs: 7/3/24    Upcoming OV   Future Appointments   Date Time Provider Department Center   12/18/2024  3:00 PM Aniket Noel MD [facility] [place]

## 2024-12-18 ENCOUNTER — OFFICE VISIT (OUTPATIENT)
Dept: NEUROLOGY | Facility: CLINIC | Age: 69
End: 2024-12-18
Payer: COMMERCIAL

## 2024-12-18 VITALS
SYSTOLIC BLOOD PRESSURE: 124 MMHG | WEIGHT: 238 LBS | DIASTOLIC BLOOD PRESSURE: 70 MMHG | BODY MASS INDEX: 40 KG/M2 | HEART RATE: 72 BPM | RESPIRATION RATE: 16 BRPM

## 2024-12-18 DIAGNOSIS — R41.3 SHORT-TERM MEMORY LOSS: ICD-10-CM

## 2024-12-18 DIAGNOSIS — G50.9 TRIGEMINAL NEUROPATHY: Primary | ICD-10-CM

## 2024-12-18 PROCEDURE — 99204 OFFICE O/P NEW MOD 45 MIN: CPT | Performed by: OTHER

## 2024-12-18 RX ORDER — GABAPENTIN 100 MG/1
CAPSULE ORAL
Qty: 120 CAPSULE | Refills: 5 | Status: SHIPPED | OUTPATIENT
Start: 2024-12-18

## 2024-12-18 NOTE — PROGRESS NOTES
Patient states tingling on the right side of the face. Patient states this started in 2021. Denies changes in chew, speaking or facial twitching.

## 2024-12-18 NOTE — PATIENT INSTRUCTIONS
Refill policies:    Allow 2-3 business days for refills; controlled substances may take longer.  Contact your pharmacy at least 5 days prior to running out of medication and have them send an electronic request or submit request through the “request refill” option in your [facility] account.  Refills are not addressed on weekends; covering physicians do not authorize routine medications on weekends.  No narcotics or controlled substances are refilled after noon on Fridays or by on call physicians.  By law, narcotics must be electronically prescribed.  A 30 day supply with no refills is the maximum allowed.  If your prescription is due for a refill, you may be due for a follow up appointment.  To best provide you care, patients receiving routine medications need to be seen at least once a year.  Patients receiving narcotic/controlled substance medications need to be seen at least once every 3 months.  In the event that your preferred pharmacy does not have the requested medication in stock (e.g. Backordered), it is your responsibility to find another pharmacy that has the requested medication available.  We will gladly send a new prescription to that pharmacy at your request.    Scheduling Tests:    If your physician has ordered radiology tests such as MRI or CT scans, please contact Central Scheduling at [phone] right away to schedule the test.  Once scheduled, the [facility] Centralized Referral Team will work with your insurance carrier to obtain pre-certification or prior authorization.  Depending on your insurance carrier, approval may take 3-10 days.  It is highly recommended patients assure they have received an authorization before having a test performed.  If test is done without insurance authorization, patient may be responsible for the entire amount billed.      Precertification and Prior Authorizations:  If your physician has recommended that you have a procedure or additional testing performed the [facility]  Centralized Referral Team will contact your insurance carrier to obtain pre-certification or prior authorization.    You are strongly encouraged to contact your insurance carrier to verify that your procedure/test has been approved and is a COVERED benefit.  Although the [facility] Centralized Referral Team does its due diligence, the insurance carrier gives the disclaimer that \"Although the procedure is authorized, this does not guarantee payment.\"    Ultimately the patient is responsible for payment.   Thank you for your understanding in this matter.  Paperwork Completion:  If you require FMLA or disability paperwork for your recovery, please make sure to either drop it off or have it faxed to our office at [phone]. Be sure the form has your name and date of birth on it.  The form will be faxed to our Forms Department and they will complete it for you.  There is a 25$ fee for all forms that need to be filled out.  Please be aware there is a 10-14 day turnaround time.  You will need to sign a release of information (BURAK) form if your paperwork does not come with one.  You may call the Forms Department with any questions at [phone].  Their fax number is [phone].

## 2024-12-18 NOTE — PROGRESS NOTES
HPI:    Patient ID: Delia Rebolledo is a 69 year old female.  PCP: Dr Padmini ABAD  Delia Rebolledo is a 69-year-old female who presented for evaluation of right facial paresthesias and memory problem.  States she had herpes zoster infection in 2021 involving the right trigeminal ophthalmic nerve distribution and since then has residual tingling sensation without any pain.  States had some tenderness when she presses the around the corner of right eye and right scalp. she is taking gabapentin which is being quite effective. Her PCP recently advise her to switch to Pregabalin.  She c/o short term memory problem, getting more noticeable, daughter reports forget information easily and repeats herself  She is independent with her activities of daily living.  No history of TIA or strokes    HISTORY:  Past Medical History:    Allergic rhinitis    Anxiety    Depression    Esophageal reflux    Hyperlipidemia      Past Surgical History:   Procedure Laterality Date    Colonoscopy  09/12/2013    repeat in 10 years    Colonoscopy  Fall of 2016    Egd  09/12/2013    Excisional biospy right Right 2016    benign    Rhys biopsy stereo nodule 1 site right (cpt=19081) Right 09/2016    2 site    Needle biopsy right Right 10/2019    Other surgical history  10/2017    biopsy of right breast due to calcifications.  Benign      Family History   Problem Relation Age of Onset    Other (allergies) Mother     Other (acid reflux) Mother     Hypertension Maternal Grandmother     Cancer Neg     Heart Disease Neg     Stroke Neg       Social History     Socioeconomic History    Marital status: Single   Occupational History    Occupation: cna   Tobacco Use    Smoking status: Never    Smokeless tobacco: Never   Vaping Use    Vaping status: Never Used   Substance and Sexual Activity    Alcohol use: Not Currently     Comment: approx 1 on holiday    Drug use: No   Other Topics Concern    Caffeine Concern Yes     Comment: coffee    Exercise Yes    Seat Belt No     Special Diet No    Stress Concern No    Weight Concern No        Review of Systems   Constitutional: Negative.    HENT: Negative.     Eyes: Negative.    Respiratory: Negative.     Cardiovascular: Negative.    Gastrointestinal: Negative.    Endocrine: Negative.    Genitourinary: Negative.    Musculoskeletal: Negative.    Skin: Negative.    Allergic/Immunologic: Negative.    Neurological:  Positive for numbness.   Hematological: Negative.    Psychiatric/Behavioral: Negative.     All other systems reviewed and are negative.           Current Outpatient Medications   Medication Sig Dispense Refill    ALENDRONATE 70 MG Oral Tab TAKE 1 TABLET BY MOUTH WEEKLY 12 tablet 0    ATORVASTATIN 10 MG Oral Tab TAKE ONE TABLET BY MOUTH ONE TIME DAILY 100 tablet 0    Tirzepatide-Weight Management (ZEPBOUND) 2.5 MG/0.5ML Subcutaneous Solution Auto-injector Inject 2.5 mg into the skin once a week. 2 mL 0    latanoprost 0.005 % Ophthalmic Solution       ibuprofen 600 MG Oral Tab Take 1 tablet (600 mg total) by mouth every 8 (eight) hours as needed for Pain. Take with food. 15 tablet 0    oxybutynin ER 5 MG Oral Tablet 24 Hr Take 1 tablet (5 mg total) by mouth daily. 90 tablet 0    Hydroquinone Does not apply Powder ID [id number] Hydroquinone 5% cream Apply to the dark areas on the forehead and cheek once a day at night      Glucosamine-Chondroitin (GLUCOSAMINE CHONDR COMPLEX OR) Take by mouth daily.      Probiotic Product (ALIGN) 4 MG Oral Cap Take by mouth as needed.      acetaminophen 500 MG Oral Tab Take 1 tablet (500 mg total) by mouth every 6 (six) hours as needed for Pain.      Multiple Vitamins-Minerals (MULTIVITAMIN OR) Take by mouth daily.      LORATADINE Take 10 mg by mouth daily as needed.        pregabalin (LYRICA) 25 MG Oral Cap Take 1 capsule (25 mg total) by mouth 2 (two) times daily. (Patient not taking: Reported on 12/18/2024) 180 capsule 0     Allergies:Allergies[1]  PHYSICAL EXAM:   Physical Exam  Blood pressure 124/70,  pulse 72, resp. rate 16, weight 238 lb (108 kg), last menstrual period 07/01/2013, not currently breastfeeding.    General appearance: Well nourished, well developed, no apparent distress.   HEENT: Normocephalic and atraumatic. Normal sclera.   Neck: Normal range of motion. Neck supple.  Cardiovascular: Normal rate, regular rhythm and normal heart sounds.    Pulmonary/Chest: Effort normal and breath sounds normal.   Abdominal: Soft. Bowel sounds are normal.   Skin: dry, clean and intact  Ext: peripheral pulses present  Psych: normal mood and affect    Neurological:  Patient is awake, alert and oriented to person, place and time   Normal memory, attention/concentration, speech and language.  [place] Cognitive Assessment:  Visuospatial/Executive ( of 5): 5  Naming ( of 3): 3  Attention ( of 6): 5  Naming ( of 3): 3  Abstraction ( of 2): 2  Delayed Recall ( of 5): 2  Orientation ( of 6): 6  Extra point for <= 12 Yr Education ( of 1): 1  Total:  Total ( of 30): 26    Cranial Nerves:   II: Visual acuity: normal  II: Visual fields: normal  III: Pupils: equal, round, reactive to light  III,IV,VI: Extra Ocular Movements: intact  V: Facial sensation: intact  VII: Facial strength: intact  VIII: Hearing: intact  IX: Palate: intact  XI: Shoulder shrug: intact  XII: Tongue movement: normal    Motor: Normal tone. Strength is  5 out of 5 in all extremities bilaterally.    Sensory: Sensory examination is normal to light touch and pinprick     Coordination: Finger-to-nose test normal bilaterally without evidence of dysmetria.    Gait: normal casual gait        TESTS/IMAGING:     MRI brain: 8/28/2024    Impression   CONCLUSION:       1. No acute intracranial abnormality identified.     2. Minimal chronic microvascular ischemic changes in the cerebral white matter.     3. Mild-to-moderate mucosal thickening in the ethmoid air cells and maxillary sinuses.  Small air-fluid level in the right maxillary sinus raising possibility of acute  sinusitis.  Clinical correlation recommended.            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 Short-term memory loss  R41.3           Post herpes right trigeminal neuropathy, non painful  Continue Gabapentin, may change the dose to 100 mg AM, 100 mg noon and 200 mg PM    2. Short term memory problem assessment done in the office was noticeable for inattention and mild delayed recall.  No signs of Dementia  MRI brain was negative for acute abnormality  Counseled on cognitive exercises in the lifestyle    If worsening will consider neuropsychology evaluation  Thank you for allowing us to participate in your patient's care.    Aniket Noel MD  [facility] [place]  Diplomate,  American Board of General &Vascular Neurology          Meds This Visit:  Requested Prescriptions      No prescriptions requested or ordered in this encounter       Imaging & Referrals:  None     ID#[id number]         [1] No Known Allergies

## 2025-01-08 DIAGNOSIS — E78.2 MIXED HYPERLIPIDEMIA: ICD-10-CM

## 2025-01-09 RX ORDER — ATORVASTATIN CALCIUM 10 MG/1
10 TABLET, FILM COATED ORAL DAILY
Qty: 100 TABLET | Refills: 0 | Status: SHIPPED | OUTPATIENT
Start: 2025-01-09

## 2025-01-09 NOTE — TELEPHONE ENCOUNTER
Atorvastatin Calcium 10 Mg Tab Nort          Will file in chart as: ATORVASTATIN 10 MG Oral Tab    Sig: TAKE ONE TABLET BY MOUTH ONE TIME DAILY    Disp: 100 tablet    Refills: 0    Start: 1/8/2025    Class: Normal    Non-formulary For: Mixed hyperlipidemia    Last ordered: 3 months ago (10/1/2024) by Angella Washington DO    Last refill: 10/1/2024    Rx #: [id number]    Cholesterol Medication Protocol [id number]/08/2025 04:13 AM   Protocol Details ALT < 80    ALT resulted within past year    Lipid panel within past 12 months    In person appointment or virtual visit in the past 12 mos or appointment in next 3 mos    Medication is active on med list      LOV 7/3/24  RTC 6 weeks  Filled 10/1/24 100 tabs 0 refills  Future Appointments   Date Time Provider Department Center   3/20/2025  9:00 AM Aniket Noel [facility]

## 2025-03-03 DIAGNOSIS — M81.0 AGE-RELATED OSTEOPOROSIS WITHOUT CURRENT PATHOLOGICAL FRACTURE: ICD-10-CM

## 2025-03-03 RX ORDER — ALENDRONATE SODIUM 70 MG/1
TABLET ORAL
Qty: 12 TABLET | Refills: 0 | Status: SHIPPED | OUTPATIENT
Start: 2025-03-03

## 2025-03-03 NOTE — TELEPHONE ENCOUNTER
Protocol FAIL    Requesting: ALENDRONATE 70 MG Oral Tab     LOV: 7/3/24  RTC: 6 WEEKS  Filled: 12/14/24 12 TABLET 0 REFILL  Recent Labs: 7/3/24    Upcoming OV   Future Appointments   Date Time Provider Department Center   3/20/2025  9:00 AM Aniket Noel MD [facility]

## 2025-03-20 ENCOUNTER — OFFICE VISIT (OUTPATIENT)
Dept: NEUROLOGY | Facility: CLINIC | Age: 70
End: 2025-03-20
Payer: MEDICARE

## 2025-03-20 VITALS
DIASTOLIC BLOOD PRESSURE: 80 MMHG | RESPIRATION RATE: 16 BRPM | SYSTOLIC BLOOD PRESSURE: 118 MMHG | WEIGHT: 235.63 LBS | HEART RATE: 88 BPM | BODY MASS INDEX: 39 KG/M2 | OXYGEN SATURATION: 98 %

## 2025-03-20 DIAGNOSIS — G50.9 TRIGEMINAL NEUROPATHY: Primary | ICD-10-CM

## 2025-03-20 PROCEDURE — 1159F MED LIST DOCD IN RCRD: CPT | Performed by: OTHER

## 2025-03-20 PROCEDURE — 1160F RVW MEDS BY RX/DR IN RCRD: CPT | Performed by: OTHER

## 2025-03-20 PROCEDURE — 99213 OFFICE O/P EST LOW 20 MIN: CPT | Performed by: OTHER

## 2025-03-20 RX ORDER — GABAPENTIN 100 MG/1
CAPSULE ORAL
Qty: 120 CAPSULE | Refills: 11 | Status: SHIPPED | OUTPATIENT
Start: 2025-03-20

## 2025-03-20 NOTE — PROGRESS NOTES
HPI:    Patient ID: Delia Rebolledo is a 69 year old female.  PCP: Dr Washington    Follow-up I think lighter and less intense leg and leg        Patient is a 69 year old female who presents for follow up. States facial paresthesia stable and current dose of Gabapentin is working well.    has been reading more and doing some crossword puzzles.  No new complaints      Delia Rebolledo is a 69-year-old female who presented for evaluation of right facial paresthesias and memory problem.  States she had herpes zoster infection in 2021 involving the right trigeminal ophthalmic nerve distribution and since then has residual tingling sensation without any pain.   had some tenderness when she presses the around the corner of right eye and right scalp. she is taking gabapentin which is being quite effective. Her PCP recently advise her to switch to Pregabalin.  She c/o short term memory problem, getting more noticeable, daughter reports forget information easily and repeats herself  She is independent with her activities of daily living.  No history of TIA or strokes    HISTORY:  Past Medical History:    Allergic rhinitis    Anxiety    Depression    Esophageal reflux    Hyperlipidemia      Past Surgical History:   Procedure Laterality Date    Colonoscopy  09/12/2013    repeat in 10 years    Colonoscopy  Fall of 2016    Egd  09/12/2013    Excisional biospy right Right 2016    benign    Rhys biopsy stereo nodule 1 site right (cpt=19081) Right 09/2016    2 site    Needle biopsy right Right 10/2019    Other surgical history  10/2017    biopsy of right breast due to calcifications.  Benign      Family History   Problem Relation Age of Onset    Other (allergies) Mother     Other (acid reflux) Mother     Hypertension Maternal Grandmother     Cancer Neg     Heart Disease Neg     Stroke Neg       Social History     Socioeconomic History    Marital status: Single   Occupational History    Occupation: cna   Tobacco Use    Smoking  status: Never    Smokeless tobacco: Never   Vaping Use    Vaping status: Never Used   Substance and Sexual Activity    Alcohol use: Not Currently     Comment: approx 1 on holiday    Drug use: No   Other Topics Concern    Caffeine Concern Yes     Comment: coffee 2 cups/day    Exercise Yes     Comment: walking a lot at work    Seat Belt No    Special Diet No    Stress Concern No    Weight Concern No        Review of Systems   Constitutional: Negative.    HENT: Negative.     Eyes: Negative.    Respiratory: Negative.     Cardiovascular: Negative.    Gastrointestinal: Negative.    Endocrine: Negative.    Genitourinary: Negative.    Musculoskeletal: Negative.    Skin: Negative.    Allergic/Immunologic: Negative.    Neurological:  Positive for numbness.   Hematological: Negative.    Psychiatric/Behavioral: Negative.     All other systems reviewed and are negative.           Current Outpatient Medications   Medication Sig Dispense Refill    ALENDRONATE 70 MG Oral Tab TAKE 1 TABLET BY MOUTH WEEKLY 12 tablet 0    ATORVASTATIN 10 MG Oral Tab TAKE ONE TABLET BY MOUTH ONE TIME DAILY 100 tablet 0    gabapentin 100 MG Oral Cap Take 100 mg AM, 100 mg noon and 200 mg  capsule 5    latanoprost 0.005 % Ophthalmic Solution       ibuprofen 600 MG Oral Tab Take 1 tablet (600 mg total) by mouth every 8 (eight) hours as needed for Pain. Take with food. 15 tablet 0    oxybutynin ER 5 MG Oral Tablet 24 Hr Take 1 tablet (5 mg total) by mouth daily. 90 tablet 0    Hydroquinone Does not apply Powder ID [id number] Hydroquinone 5% cream Apply to the dark areas on the forehead and cheek once a day at night      Glucosamine-Chondroitin (GLUCOSAMINE CHONDR COMPLEX OR) Take by mouth daily.      Probiotic Product (ALIGN) 4 MG Oral Cap Take by mouth as needed.      acetaminophen 500 MG Oral Tab Take 1 tablet (500 mg total) by mouth every 6 (six) hours as needed for Pain.      Multiple Vitamins-Minerals (MULTIVITAMIN OR) Take by mouth daily.       LORATADINE Take 10 mg by mouth daily as needed.         Allergies:Allergies[1]  PHYSICAL EXAM:   Physical Exam  Pulse 88, resp. rate 16, weight 235 lb 9.6 oz (106.9 kg), last menstrual period 07/01/2013, SpO2 98%, not currently breastfeeding.    General appearance: Well nourished, well developed, no apparent distress.   HEENT: Normocephalic and atraumatic. Normal sclera.   Neck: Normal range of motion. Neck supple.  Cardiovascular: Normal rate, regular rhythm and normal heart sounds.    Pulmonary/Chest: Effort normal and breath sounds normal.   Abdominal: Soft. Bowel sounds are normal.   Skin: dry, clean and intact  Ext: peripheral pulses present  Psych: normal mood and affect    Neurological:  Patient is awake, alert and oriented to person, place and time   Normal memory, attention/concentration, speech and language.  [place] Cognitive Assessment:                          Total:       Cranial Nerves:   II: Visual acuity: normal  II: Visual fields: normal  III: Pupils: equal, round, reactive to light  III,IV,VI: Extra Ocular Movements: intact  V: Facial sensation: intact  VII: Facial strength: intact  VIII: Hearing: intact  IX: Palate: intact  XI: Shoulder shrug: intact  XII: Tongue movement: normal    Motor: Normal tone. Strength is  5 out of 5 in all extremities bilaterally.    Sensory: Sensory examination is normal to light touch and pinprick     Coordination: Finger-to-nose test normal bilaterally without evidence of dysmetria.    Gait: normal casual gait        TESTS/IMAGING:     MRI brain: 8/28/2024    Impression   CONCLUSION:       1. No acute intracranial abnormality identified.     2. Minimal chronic microvascular ischemic changes in the cerebral white matter.     3. Mild-to-moderate mucosal thickening in the ethmoid air cells and maxillary sinuses.  Small air-fluid level in the right maxillary sinus raising possibility of acute sinusitis.  Clinical correlation recommended.            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 Short-term memory loss  R41.3           Post herpes right trigeminal neuropathy, non painful  Continue Gabapentin 100 mg AM, 100 mg noon and 200 mg PM    2. Short term memory problem assessment done in the office was noticeable for inattention and mild delayed recall.  No signs of Dementia  MRI brain was negative for acute abnormality  Counseled on cognitive exercises in the lifestyle    If worsening will consider neuropsychology evaluation  Follow-up in about 1 year      Aniket Noel MD  [facility] [place]  Diplomate,  American Board of General &Vascular Neurology          Meds This Visit:  Requested Prescriptions      No prescriptions requested or ordered in this encounter       Imaging & Referrals:  None     ID#[id number]         [1] No Known Allergies

## 2025-03-20 NOTE — PATIENT INSTRUCTIONS
Refill policies:    Allow 2-3 business days for refills; controlled substances may take longer.  Contact your pharmacy at least 5 days prior to running out of medication and have them send an electronic request or submit request through the “request refill” option in your [employer] account.  Refills are not addressed on weekends; covering physicians do not authorize routine medications on weekends.  No narcotics or controlled substances are refilled after noon on Fridays or by on call physicians.  By law, narcotics must be electronically prescribed.  A 30 day supply with no refills is the maximum allowed.  If your prescription is due for a refill, you may be due for a follow up appointment.  To best provide you care, patients receiving routine medications need to be seen at least once a year.  Patients receiving narcotic/controlled substance medications need to be seen at least once every 3 months.  In the event that your preferred pharmacy does not have the requested medication in stock (e.g. Backordered), it is your responsibility to find another pharmacy that has the requested medication available.  We will gladly send a new prescription to that pharmacy at your request.    Scheduling Tests:    If your physician has ordered radiology tests such as MRI or CT scans, please contact Central Scheduling at [phone] right away to schedule the test.  Once scheduled, the [facility] Centralized Referral Team will work with your insurance carrier to obtain pre-certification or prior authorization.  Depending on your insurance carrier, approval may take 3-10 days.  It is highly recommended patients assure they have received an authorization before having a test performed.  If test is done without insurance authorization, patient may be responsible for the entire amount billed.      Precertification and Prior Authorizations:  If your physician has recommended that you have a procedure or additional testing performed the [facility]  Centralized Referral Team will contact your insurance carrier to obtain pre-certification or prior authorization.    You are strongly encouraged to contact your insurance carrier to verify that your procedure/test has been approved and is a COVERED benefit.  Although the [facility] Centralized Referral Team does its due diligence, the insurance carrier gives the disclaimer that \"Although the procedure is authorized, this does not guarantee payment.\"    Ultimately the patient is responsible for payment.   Thank you for your understanding in this matter.  Paperwork Completion:  If you require FMLA or disability paperwork for your recovery, please make sure to either drop it off or have it faxed to our office at [phone]. Be sure the form has your name and date of birth on it.  The form will be faxed to our Forms Department and they will complete it for you.  There is a 25$ fee for all forms that need to be filled out.  Please be aware there is a 10-14 day turnaround time.  You will need to sign a release of information (BURAK) form if your paperwork does not come with one.  You may call the Forms Department with any questions at [phone].  Their fax number is [phone].

## 2025-03-24 DIAGNOSIS — E78.2 MIXED HYPERLIPIDEMIA: ICD-10-CM

## 2025-03-25 RX ORDER — ATORVASTATIN CALCIUM 10 MG/1
10 TABLET, FILM COATED ORAL DAILY
Qty: 100 TABLET | Refills: 0 | Status: SHIPPED | OUTPATIENT
Start: 2025-03-25

## 2025-03-25 NOTE — TELEPHONE ENCOUNTER
Name from pharmacy: Atorvastatin Calcium 10 Mg Tab Nort         Will file in chart as: ATORVASTATIN 10 MG Oral Tab    Sig: TAKE ONE TABLET BY MOUTH ONE TIME DAILY    Disp: 100 tablet    Refills: 0    Start: 3/24/2025    Class: Normal    Non-formulary For: Mixed hyperlipidemia    Last ordered: 2 months ago (1/9/2025) by Angella Washington DO    Last refill: 1/9/2025    Rx #: [id number]    Cholesterol Medication Protocol Wyntlj3203/24/2025 07:28 PM   Protocol Details ALT < 80    ALT resulted within past year    Lipid panel within past 12 months    In person appointment or virtual visit in the past 12 mos or appointment in next 3 mos    Medication is active on med list      To be filled at: [id number] - [place] - [address] [place] [address] [phone], [phone]

## 2025-05-23 DIAGNOSIS — M81.0 AGE-RELATED OSTEOPOROSIS WITHOUT CURRENT PATHOLOGICAL FRACTURE: ICD-10-CM

## 2025-05-23 RX ORDER — ALENDRONATE SODIUM 70 MG/1
70 TABLET ORAL WEEKLY
Qty: 12 TABLET | Refills: 3 | Status: SHIPPED | OUTPATIENT
Start: 2025-05-23

## 2025-05-23 NOTE — TELEPHONE ENCOUNTER
LOV: 7/3/2024 with Dr. Washington  RTC: 6 weeks  Last Relevant Labs: 7/3/2024  Bone Dexa Scan 8/7/2024  Filled: 3/3/2025    #12 with 0 refills    No future appointments.

## 2025-05-27 ENCOUNTER — PATIENT MESSAGE (OUTPATIENT)
Dept: INTERNAL MEDICINE CLINIC | Facility: CLINIC | Age: 70
End: 2025-05-27

## 2025-06-20 DIAGNOSIS — E78.2 MIXED HYPERLIPIDEMIA: ICD-10-CM

## 2025-06-20 RX ORDER — ATORVASTATIN CALCIUM 10 MG/1
10 TABLET, FILM COATED ORAL DAILY
Qty: 100 TABLET | Refills: 0 | Status: SHIPPED | OUTPATIENT
Start: 2025-06-20

## 2025-06-20 NOTE — TELEPHONE ENCOUNTER
Requesting    Name from pharmacy: Atorvastatin Calcium 10 Mg Tab Nort         Will file in chart as: ATORVASTATIN 10 MG Oral Tab    Sig: TAKE ONE TABLET BY MOUTH ONE TIME DAILY    Disp: 100 tablet    Refills: 0    Start: 6/20/2025    Class: Normal    Non-formulary For: Mixed hyperlipidemia    Last ordered: 2 months ago (3/25/2025) by Angella Washington DO    Last refill: 3/25/2025    Rx #: [id number]    Cholesterol Medication Protocol [id number]/20/2025 08:56 AM   Protocol Details ALT < 80    ALT resulted within past year    Lipid panel within past 12 months    In person appointment or virtual visit in the past 12 mos or appointment in next 3 mos    Medication is active on med list        LOV: 7/3/2024  RTC: 6 weeks   Last Relevant Labs: 7/3/2024  Filled: 3/25/2025 #100 with 0 refills    Future Appointments   Date Time Provider Department Center   7/16/2025 11:30 AM Angella Washington DO [facility]

## 2025-07-16 ENCOUNTER — OFFICE VISIT (OUTPATIENT)
Dept: INTERNAL MEDICINE CLINIC | Facility: CLINIC | Age: 70
End: 2025-07-16
Payer: MEDICARE

## 2025-07-16 ENCOUNTER — LAB ENCOUNTER (OUTPATIENT)
Dept: LAB | Age: 70
End: 2025-07-16
Attending: INTERNAL MEDICINE
Payer: MEDICARE

## 2025-07-16 VITALS
HEART RATE: 61 BPM | DIASTOLIC BLOOD PRESSURE: 80 MMHG | SYSTOLIC BLOOD PRESSURE: 126 MMHG | OXYGEN SATURATION: 97 % | HEIGHT: 65 IN | WEIGHT: 239 LBS | BODY MASS INDEX: 39.82 KG/M2 | TEMPERATURE: 98 F | RESPIRATION RATE: 16 BRPM

## 2025-07-16 DIAGNOSIS — Z00.00 ROUTINE PHYSICAL EXAMINATION: Primary | ICD-10-CM

## 2025-07-16 DIAGNOSIS — E66.813 CLASS 3 SEVERE OBESITY WITHOUT SERIOUS COMORBIDITY WITH BODY MASS INDEX (BMI) OF 40.0 TO 44.9 IN ADULT: ICD-10-CM

## 2025-07-16 DIAGNOSIS — F41.9 ANXIETY: ICD-10-CM

## 2025-07-16 DIAGNOSIS — E78.2 MIXED HYPERLIPIDEMIA: ICD-10-CM

## 2025-07-16 DIAGNOSIS — Z12.31 VISIT FOR SCREENING MAMMOGRAM: ICD-10-CM

## 2025-07-16 DIAGNOSIS — K21.9 GASTROESOPHAGEAL REFLUX DISEASE, UNSPECIFIED WHETHER ESOPHAGITIS PRESENT: ICD-10-CM

## 2025-07-16 DIAGNOSIS — E55.9 VITAMIN D DEFICIENCY: ICD-10-CM

## 2025-07-16 DIAGNOSIS — N18.31 STAGE 3A CHRONIC KIDNEY DISEASE (HCC): ICD-10-CM

## 2025-07-16 DIAGNOSIS — Z12.11 SCREENING FOR COLON CANCER: ICD-10-CM

## 2025-07-16 DIAGNOSIS — M81.0 AGE-RELATED OSTEOPOROSIS WITHOUT CURRENT PATHOLOGICAL FRACTURE: ICD-10-CM

## 2025-07-16 DIAGNOSIS — R73.03 PREDIABETES: ICD-10-CM

## 2025-07-16 DIAGNOSIS — B02.29 POST HERPETIC NEURALGIA: ICD-10-CM

## 2025-07-16 LAB
ALBUMIN SERPL-MCNC: 4.5 G/DL (ref 3.2–4.8)
ALBUMIN/GLOB SERPL: 1.4 {RATIO} (ref 1–2)
ALP LIVER SERPL-CCNC: 51 U/L (ref 55–142)
ALT SERPL-CCNC: 12 U/L (ref 10–49)
ANION GAP SERPL CALC-SCNC: 7 MMOL/L (ref 0–18)
AST SERPL-CCNC: 27 U/L (ref ?–34)
BASOPHILS # BLD AUTO: 0.03 X10(3) UL (ref 0–0.2)
BASOPHILS NFR BLD AUTO: 0.6 %
BILIRUB SERPL-MCNC: 0.6 MG/DL (ref 0.2–1.1)
BUN BLD-MCNC: 12 MG/DL (ref 9–23)
CALCIUM BLD-MCNC: 9.8 MG/DL (ref 8.7–10.6)
CHLORIDE SERPL-SCNC: 106 MMOL/L (ref 98–112)
CHOLEST SERPL-MCNC: 151 MG/DL (ref ?–200)
CO2 SERPL-SCNC: 30 MMOL/L (ref 21–32)
CREAT BLD-MCNC: 1.34 MG/DL (ref 0.55–1.02)
EGFRCR SERPLBLD CKD-EPI 2021: 43 ML/MIN/1.73M2 (ref 60–?)
EOSINOPHIL # BLD AUTO: 0.15 X10(3) UL (ref 0–0.7)
EOSINOPHIL NFR BLD AUTO: 3.1 %
ERYTHROCYTE [DISTWIDTH] IN BLOOD BY AUTOMATED COUNT: 13.6 %
EST. AVERAGE GLUCOSE BLD GHB EST-MCNC: 105 MG/DL (ref 68–126)
FASTING PATIENT LIPID ANSWER: NO
FASTING STATUS PATIENT QL REPORTED: NO
GLOBULIN PLAS-MCNC: 3.2 G/DL (ref 2–3.5)
GLUCOSE BLD-MCNC: 82 MG/DL (ref 70–99)
HBA1C MFR BLD: 5.3 % (ref ?–5.7)
HCT VFR BLD AUTO: 41.5 % (ref 35–48)
HDLC SERPL-MCNC: 60 MG/DL (ref 40–59)
HGB BLD-MCNC: 13.7 G/DL (ref 12–16)
IMM GRANULOCYTES # BLD AUTO: 0.01 X10(3) UL (ref 0–1)
IMM GRANULOCYTES NFR BLD: 0.2 %
LDLC SERPL CALC-MCNC: 78 MG/DL (ref ?–100)
LYMPHOCYTES # BLD AUTO: 1.65 X10(3) UL (ref 1–4)
LYMPHOCYTES NFR BLD AUTO: 34 %
MCH RBC QN AUTO: 29.8 PG (ref 26–34)
MCHC RBC AUTO-ENTMCNC: 33 G/DL (ref 31–37)
MCV RBC AUTO: 90.2 FL (ref 80–100)
MONOCYTES # BLD AUTO: 0.29 X10(3) UL (ref 0.1–1)
MONOCYTES NFR BLD AUTO: 6 %
NEUTROPHILS # BLD AUTO: 2.72 X10 (3) UL (ref 1.5–7.7)
NEUTROPHILS # BLD AUTO: 2.72 X10(3) UL (ref 1.5–7.7)
NEUTROPHILS NFR BLD AUTO: 56.1 %
NONHDLC SERPL-MCNC: 91 MG/DL (ref ?–130)
OSMOLALITY SERPL CALC.SUM OF ELEC: 295 MOSM/KG (ref 275–295)
PLATELET # BLD AUTO: 263 10(3)UL (ref 150–450)
POTASSIUM SERPL-SCNC: 4.2 MMOL/L (ref 3.5–5.1)
PROT SERPL-MCNC: 7.7 G/DL (ref 5.7–8.2)
RBC # BLD AUTO: 4.6 X10(6)UL (ref 3.8–5.3)
SODIUM SERPL-SCNC: 143 MMOL/L (ref 136–145)
TRIGL SERPL-MCNC: 63 MG/DL (ref 30–149)
TSI SER-ACNC: 1.98 UIU/ML (ref 0.55–4.78)
VIT D+METAB SERPL-MCNC: 47.1 NG/ML (ref 30–100)
VLDLC SERPL CALC-MCNC: 10 MG/DL (ref 0–30)
WBC # BLD AUTO: 4.9 X10(3) UL (ref 4–11)

## 2025-07-16 PROCEDURE — G2211 COMPLEX E/M VISIT ADD ON: HCPCS | Performed by: INTERNAL MEDICINE

## 2025-07-16 PROCEDURE — 80053 COMPREHEN METABOLIC PANEL: CPT | Performed by: INTERNAL MEDICINE

## 2025-07-16 PROCEDURE — 96160 PT-FOCUSED HLTH RISK ASSMT: CPT | Performed by: INTERNAL MEDICINE

## 2025-07-16 PROCEDURE — 83036 HEMOGLOBIN GLYCOSYLATED A1C: CPT | Performed by: INTERNAL MEDICINE

## 2025-07-16 PROCEDURE — 84443 ASSAY THYROID STIM HORMONE: CPT | Performed by: INTERNAL MEDICINE

## 2025-07-16 PROCEDURE — 1159F MED LIST DOCD IN RCRD: CPT | Performed by: INTERNAL MEDICINE

## 2025-07-16 PROCEDURE — 1170F FXNL STATUS ASSESSED: CPT | Performed by: INTERNAL MEDICINE

## 2025-07-16 PROCEDURE — 1126F AMNT PAIN NOTED NONE PRSNT: CPT | Performed by: INTERNAL MEDICINE

## 2025-07-16 PROCEDURE — 99213 OFFICE O/P EST LOW 20 MIN: CPT | Performed by: INTERNAL MEDICINE

## 2025-07-16 PROCEDURE — 80061 LIPID PANEL: CPT | Performed by: INTERNAL MEDICINE

## 2025-07-16 PROCEDURE — 85025 COMPLETE CBC W/AUTO DIFF WBC: CPT | Performed by: INTERNAL MEDICINE

## 2025-07-16 PROCEDURE — G0439 PPPS, SUBSEQ VISIT: HCPCS | Performed by: INTERNAL MEDICINE

## 2025-07-16 PROCEDURE — 82306 VITAMIN D 25 HYDROXY: CPT | Performed by: INTERNAL MEDICINE

## 2025-07-16 PROCEDURE — 36415 COLL VENOUS BLD VENIPUNCTURE: CPT | Performed by: INTERNAL MEDICINE

## 2025-07-16 RX ORDER — OMEPRAZOLE 20 MG/1
20 CAPSULE, DELAYED RELEASE ORAL
Qty: 90 CAPSULE | Refills: 0 | Status: SHIPPED | OUTPATIENT
Start: 2025-07-16

## 2025-07-16 NOTE — PROGRESS NOTES
Patient Office Visit    ASSESSMENT AND PLAN:   1. Routine physical examination  Note: Continue to exercise at least 150 minutes a week and Eat a plant based diet. Please take 2000 IU of vitamin D daily for life to keep your bones strong. Please see your dentist every 6 months.  Continue with regular eye exams.  - CBC With Differential With Platelet  - Comp Metabolic Panel (14)  - Lipid Panel  - Hemoglobin A1C  - TSH W Reflex To Free T4    2. Visit for screening mammogram  - 3D Mammogram Digital Screen, Bilateral (CPT=77067/29096); Future    3. Stage 3a chronic kidney disease (HCC)  Note: Keep hydrated and avoid NSAIDs on a regular basis    4. Class 3 severe obesity without serious comorbidity with body mass index (BMI) of 40.0 to 44.9 in adult  Note: We discussed about topiramate and metformin.  Will hold off on any oral medicines for now and consider starting the gym.  Referral to the fitness center placed  - TSH W Reflex To Free T4  - [facility] Referral - Internal    5. Mixed hyperlipidemia  Note: Just restarted her statin 2 weeks ago but will repeat labs  - Lipid Panel    6. Post herpetic neuralgia  Note: As patient is tolerating gabapentin we will continue with the current dose.  She takes 200 mg in the morning and 200 mg in the evening.  She has seen the neurologist and there was no concern for memory loss but rather inattention  - Comp Metabolic Panel (14)    7. Age-related osteoporosis without current pathological fracture  Note: Continue repeat a bone density in 2026  - Comp Metabolic Panel (14)    8. Vitamin D deficiency  - VITAMIN D, 25-HYDROXY [29556][Q]    9. Prediabetes  Note: Diet controlled  - Hemoglobin A1C    10. Gastroesophageal reflux disease, unspecified whether esophagitis present   Note: Will start medicine below and see how patient does with this.  Discussed to make lifestyle changes as well  - omeprazole 20 MG Oral Capsule Delayed Release; Take 1 capsule (20 mg total) by mouth  every morning before breakfast.  Dispense: 90 capsule; Refill: 0    11. Anxiety  Note: Referral to the therapist    12. Screening for colon cancer  - Gastro Referral - External    Return to clinic yearly for routine physical      Patient/Caregiver Education: Patient/Caregiver Education: There are no barriers to learning. Medical education done. Outcome: Patient verbalizes understanding. Patient is notified to call with any questions, complications, allergies, or worsening or changing symptoms.  Patient is to call with any side effects or complications from the treatments as a result of today.      Reviewed Past Medical History and   Problem List[1]    Orders Placed This Encounter   Procedures    CBC With Differential With Platelet    Comp Metabolic Panel (14)    Lipid Panel    Hemoglobin A1C    TSH W Reflex To Free T4    VITAMIN D, 25-HYDROXY [82442][Q]     Release to patient:   Immediate     Requested Prescriptions     Signed Prescriptions Disp Refills    omeprazole 20 MG Oral Capsule Delayed Release 90 capsule 0     Sig: Take 1 capsule (20 mg total) by mouth every morning before breakfast.         Angella Washington DO  CC:  Chief Complaint   Patient presents with    Annual     MAW         HPI:   Delia Rebolledo is a 69-year-old female who presents for a routine physical.  Here with her daughter    CKD/postherpetic neuralgia: Stable on medicine.  She did see the neurologist and the gabapentin dose was adjusted.  The patient feels that when she decreases the dose her symptoms get worse and she would like to continue the current dose  Acid reflux: The daughter has noticed that the patient feels like there is something stuck in the throat and occasionally has a wheezing sound.  The patient does drink a lot of soda and was told that she has acid reflux about 10 years ago.  She was given medicines which she took for a few months but then stopped it completely.  She did take Pepcid but she would still have the symptoms  and would like to try something stronger  Hyperlipidemia: She had stopped the statin but has restarted it a couple weeks ago  Anxiety: The patient does not report any anxiety but the daughter has mentioned it and she would like her to consider therapy  Obesity: She is wondering if there are any medications for weight loss or if she can join the gym   osteoporosis: Stable on medicine      Past Medical History[2]    Past Surgical History[3]    Social History:  Short Social Hx on File[4]  Family History:  Family History[5]  Allergies:  Allergies[6]  Current Meds:  Medications Ordered Prior to Encounter[7]      REVIEW OF SYSTEMS   Constitutional: no fatigue normal energy no weight changes   HENT: normal sinuses and no mouth issues   Eyes: . normal vision no eye pain   Respiratory: normal respirations no cough   Cardiovascular: no CP, or palpitations   Gastrointestinal: normal bowels and no abd pains   Genitourinary:  normal urination no hematuria, no frequency   Musculoskeletal: no pains in arms/legs, normal range of motion   Skin: no rashes or skin lesions that are new   Neurological:  no weakness, no numbness, normal gait   Hematological:  no bruises or bleeding   Psychiatric/Behavioral: normal mood no anxiety normal behavior     /80 (BP Location: Left arm, Patient Position: Sitting, Cuff Size: adult)   Pulse 61   Temp 98.1 °F (36.7 °C) (Temporal)   Resp 16   Ht 5' 5\" (1.651 m)   Wt 239 lb (108.4 kg)   LMP 07/01/2013   SpO2 97%   BMI 39.77 kg/m²     PHYSICAL EXAM:   Constitutional: Vital signs reviewed as noted, well developed, in no acute distress.   HENT: NCAT, bilateral ear canal and tympanic membrane appear normal  Eyes: pupils reactive bilaterally  Neck: No thyroidmegaly  Cardiovascular: nl s1 s2 no m/r/g  Pulmonary/Chest: CTA bilaterally with no wheezes  Abdominal: Soft NT normal Bowel sounds  Extremities: no pedal edema   Neurological:  no weakness in UE and LE, reflexes are normal  Skin: no  rashes or bruises on visualized skin, not undressed   Psychiatric:normal mood              [1]   Patient Active Problem List  Diagnosis    Stage 3a chronic kidney disease (HCC)    Class 3 severe obesity without serious comorbidity with body mass index (BMI) of 40.0 to 44.9 in adult    Post herpetic neuralgia    Age-related osteoporosis without current pathological fracture    Hyperlipidemia   [2]   Past Medical History:   Allergic rhinitis    Anxiety    Depression    Esophageal reflux    Hyperlipidemia   [3]   Past Surgical History:  Procedure Laterality Date    Colonoscopy  09/12/2013    repeat in 10 years    Colonoscopy  Fall of 2016    Egd  09/12/2013    Excisional biospy right Right 2016    benign    Rhys biopsy stereo nodule 1 site right (cpt=19081) Right 09/2016    2 site    Needle biopsy right Right 10/2019    Other surgical history  10/2017    biopsy of right breast due to calcifications.  Benign   [4]   Social History  Socioeconomic History    Marital status: Single   Occupational History    Occupation: cna   Tobacco Use    Smoking status: Never    Smokeless tobacco: Never   Vaping Use    Vaping status: Never Used   Substance and Sexual Activity    Alcohol use: Not Currently     Comment: approx 1 on holiday    Drug use: No   Other Topics Concern    Caffeine Concern Yes     Comment: coffee 2 cups/day    Exercise Yes     Comment: walking a lot at work    Seat Belt No    Special Diet No    Stress Concern No    Weight Concern No     Social Drivers of Health     Food Insecurity: No Food Insecurity (7/16/2025)    NCSS - Food Insecurity     Worried About Running Out of Food in the Last Year: No     Ran Out of Food in the Last Year: No   Transportation Needs: No Transportation Needs (7/16/2025)    NCSS - Transportation     Lack of Transportation: No   Housing Stability: Not At Risk (7/16/2025)    NCSS - Housing/Utilities     Has Housing: Yes     Worried About Losing Housing: No     Unable to Get Utilities: No   [5]    Family History  Problem Relation Age of Onset    Other (allergies) Mother     Other (acid reflux) Mother     Hypertension Maternal Grandmother     Cancer Neg     Heart Disease Neg     Stroke Neg    [6] No Known Allergies  [7]   Current Outpatient Medications on File Prior to Visit   Medication Sig Dispense Refill    ATORVASTATIN 10 MG Oral Tab TAKE ONE TABLET BY MOUTH ONE TIME DAILY 100 tablet 0    ALENDRONATE 70 MG Oral Tab TAKE 1 TABLET BY MOUTH WEEKLY 12 tablet 3    gabapentin 100 MG Oral Cap Take 100 mg AM, 100 mg noon and 200 mg  capsule 11    latanoprost 0.005 % Ophthalmic Solution       ibuprofen 600 MG Oral Tab Take 1 tablet (600 mg total) by mouth every 8 (eight) hours as needed for Pain. Take with food. 15 tablet 0    oxybutynin ER 5 MG Oral Tablet 24 Hr Take 1 tablet (5 mg total) by mouth daily. 90 tablet 0    Glucosamine-Chondroitin (GLUCOSAMINE CHONDR COMPLEX OR) Take by mouth daily.      Probiotic Product (ALIGN) 4 MG Oral Cap Take by mouth as needed.      acetaminophen 500 MG Oral Tab Take 1 tablet (500 mg total) by mouth every 6 (six) hours as needed for Pain.      Multiple Vitamins-Minerals (MULTIVITAMIN OR) Take by mouth daily.      LORATADINE Take 10 mg by mouth daily as needed.         No current facility-administered medications on file prior to visit.

## 2025-07-16 NOTE — PATIENT INSTRUCTIONS
Continue to exercise at least 150 minutes a week and Eat a plant based diet     Please take 2000 IU of vitamin D daily for life to keep your bones strong    Please see your dentist every 6 months    Continue with regular eye exams    I have referred you to the GI specialist Dr. Kirby for colonoscopy    I have referred you to a therapist and they will give you a call    Lets start omeprazole 20 mg about 30 minutes before breakfast and see how you do with it.  If the wheezing sensation persist then please let the GI doctor know    Please start going to the [facility] fitness center and I have placed a referral    Please have blood work done    Schedule your mammogram    See me back yearly for physical

## 2025-08-19 ENCOUNTER — PATIENT MESSAGE (OUTPATIENT)
Dept: INTERNAL MEDICINE CLINIC | Facility: CLINIC | Age: 70
End: 2025-08-19

## 2025-08-19 DIAGNOSIS — R73.03 PREDIABETES: Primary | ICD-10-CM

## 2025-08-20 ENCOUNTER — HOSPITAL ENCOUNTER (OUTPATIENT)
Dept: MAMMOGRAPHY | Age: 70
Discharge: HOME | End: 2025-08-20
Attending: INTERNAL MEDICINE

## 2025-08-20 DIAGNOSIS — Z12.31 VISIT FOR SCREENING MAMMOGRAM: ICD-10-CM

## 2025-08-20 PROCEDURE — 77067 SCR MAMMO BI INCL CAD: CPT | Performed by: INTERNAL MEDICINE

## 2025-08-20 PROCEDURE — 77063 BREAST TOMOSYNTHESIS BI: CPT | Performed by: INTERNAL MEDICINE

## (undated) NOTE — LETTER
Date & Time: 1/31/2024, 4:04 PM  Patient: Delia Rebolledo  Encounter Provider(s):    Yvonne Harrison PA-C       To Whom It May Concern:    Delia Rebolledo was seen and treated in our department on 1/31/2024. Please excuse her from work on 2/2/2024 and 2/3/2024.    If you have any questions or concerns, please do not hesitate to call.        _____________________________  Physician/APC Signature

## (undated) NOTE — LETTER
Date: 3/15/2024    Patient Name: Delia Rebolledo          To Whom it may concern:    This letter has been written at the patient's request. The above patient was seen at [facility] for treatment of a medical condition.    The patient can return to work sedentary duty, desk work only for 8-12 weeks as tolerated.     Sincerely,          Sincer JUSTINO Su [facility], PA-C Orthopedic Surgery / Sports Medicine Specialist  [facility]  [address] [place] [address].[facility]  [email or web address]  t: [phone]  o: [phone]  f: [phone]    This note was dictated using Dragon software.  While it was briefly proofread prior to completion, some grammatical, spelling, and word choice errors due to dictation may still occur.

## (undated) NOTE — LETTER
Date: 5/6/2024    Patient Name: Delia Rebolledo          To Whom it may concern:    This letter has been written at the patient's request. The above patient was seen at [facility] for treatment of a medical condition.    No restrictions at this time, proceed with all activity as tolerated.       Sincerely,          Sincer CHIQUI Avalos, PAJose AlejandroC Orthopedic Surgery / Sports Medicine Specialist  [facility]  [address] [place] [address].[email or web address]  [email or web address]  t: [phone]  o: [phone]  f: [phone]    This note was dictated using Dragon software.  While it was briefly proofread prior to completion, some grammatical, spelling, and word choice errors due to dictation may still occur.

## (undated) NOTE — LETTER
Date: 8/2/2024    Patient Name: Delia Rebolledo          To Whom it may concern:    This letter has been written at the patient's request. Please excuse the above patient from attending to patient's who are suspected with Covid-19 or with a positive Covid-19 infection through 9/1/24.             Sincerely,      Angella Washington DO

## (undated) NOTE — LETTER
Date & Time: 1/25/2019, 3:30 PM  Patient: Koki May  Encounter Provider(s):    MALI Heart       To Whom It May Concern:    Koki May was seen and treated in our department on 1/25/2019.  She can return to work with limited standing and wal

## (undated) NOTE — LETTER
Patient Name: Jarrett Trammell  YOB: 1955          MRN number:  [id number]  Date:  7/19/2018  Referring Physician:  Han Singh     Progress/Discharge Summary    Dx: neck pain, acute (B) low back pain      Pt has attended  6 visits in Physical T treatment options and has agreed to actively participate in planning and for this course of care. Thank you for your referral. If you have any questions, please contact me at Dept: [phone].     Sincerely,  Electronically signed by therapist: Svetlana Hernandez

## (undated) NOTE — LETTER
Date: 2/2/2024    Patient Name: Delia Rebolledo          To Whom it may concern:    This letter has been written at the patient's request. The above patient was seen at the [facility] for treatment of a medical condition.    This patient should be excused from attending work due to an orthopedic condition, until further notice.      Sincerely,          Sincer JUSTINO Su [facility], PA-C Orthopedic Surgery / Sports Medicine Specialist  [facility]  [address] [place] [address].org  [email or web address]  t: [phone]  o: [phone]  f: [phone]